# Patient Record
Sex: FEMALE | Race: BLACK OR AFRICAN AMERICAN | NOT HISPANIC OR LATINO | Employment: UNEMPLOYED | URBAN - METROPOLITAN AREA
[De-identification: names, ages, dates, MRNs, and addresses within clinical notes are randomized per-mention and may not be internally consistent; named-entity substitution may affect disease eponyms.]

---

## 2022-06-23 ENCOUNTER — HOSPITAL ENCOUNTER (EMERGENCY)
Facility: HOSPITAL | Age: 27
Discharge: HOME | End: 2022-06-23
Attending: FAMILY MEDICINE
Payer: MEDICAID

## 2022-06-23 VITALS
BODY MASS INDEX: 39.56 KG/M2 | DIASTOLIC BLOOD PRESSURE: 83 MMHG | HEART RATE: 79 BPM | HEIGHT: 62 IN | SYSTOLIC BLOOD PRESSURE: 141 MMHG | WEIGHT: 215 LBS | OXYGEN SATURATION: 98 % | RESPIRATION RATE: 18 BRPM | TEMPERATURE: 98 F

## 2022-06-23 DIAGNOSIS — J06.9 VIRAL URI WITH COUGH: Primary | ICD-10-CM

## 2022-06-23 PROCEDURE — 99284 EMERGENCY DEPT VISIT MOD MDM: CPT | Mod: 25

## 2022-06-23 RX ORDER — LORATADINE 10 MG/1
10 TABLET ORAL DAILY
Qty: 60 TABLET | Refills: 0 | Status: ON HOLD | OUTPATIENT
Start: 2022-06-23 | End: 2023-06-09

## 2022-06-23 RX ORDER — METHYLPREDNISOLONE 4 MG/1
TABLET ORAL
Qty: 21 EACH | Refills: 0 | Status: ON HOLD | OUTPATIENT
Start: 2022-06-23 | End: 2023-06-09

## 2022-06-23 RX ORDER — BENZONATATE 100 MG/1
100 CAPSULE ORAL 3 TIMES DAILY PRN
Qty: 15 CAPSULE | Refills: 0 | Status: SHIPPED | OUTPATIENT
Start: 2022-06-23 | End: 2022-06-28

## 2022-06-23 NOTE — ED PROVIDER NOTES
Encounter Date: 6/23/2022       History     Chief Complaint   Patient presents with    Facial Pain    Cough     27-year-old female presents with nasal congestion and dry cough for the past 2 days.  Patient also checked in with 5 of her children.  They are sick with what appears to be viral gastroenteritis.  Patient denies fever, chest pain, shortness of breath.  Denies possibility of pregnancy.  She is not breastfeeding.  No other complaints.        Review of patient's allergies indicates:  No Known Allergies  No past medical history on file.  No past surgical history on file.  No family history on file.     Review of Systems   Constitutional: Negative.    HENT: Positive for congestion.    Eyes: Negative.    Respiratory: Positive for cough.    Cardiovascular: Negative.    Gastrointestinal: Negative.    Endocrine: Negative.    Genitourinary: Negative.    Musculoskeletal: Negative.    Skin: Negative.    Allergic/Immunologic: Negative.    Neurological: Negative.    Hematological: Negative.    Psychiatric/Behavioral: Negative.        Physical Exam     Initial Vitals [06/23/22 0041]   BP Pulse Resp Temp SpO2   (!) 141/83 79 18 98.1 °F (36.7 °C) 98 %      MAP       --         Physical Exam    Nursing note and vitals reviewed.  Constitutional: She appears well-developed and well-nourished.   HENT:   Head: Normocephalic and atraumatic.   Mouth/Throat: Oropharynx is clear and moist.   Eyes: EOM are normal. Pupils are equal, round, and reactive to light.   Neck: Neck supple.   Normal range of motion.  Cardiovascular: Normal rate and regular rhythm.   Pulmonary/Chest: Breath sounds normal.   Abdominal: Abdomen is soft. Bowel sounds are normal.   Musculoskeletal:         General: Normal range of motion.      Cervical back: Normal range of motion and neck supple.     Neurological: She is alert and oriented to person, place, and time. She has normal strength. GCS score is 15. GCS eye subscore is 4. GCS verbal subscore is 5. GCS  motor subscore is 6.   Skin: Skin is warm. Capillary refill takes less than 2 seconds.   Psychiatric: She has a normal mood and affect.         ED Course   Procedures  Labs Reviewed - No data to display       Imaging Results    None          Medications - No data to display                       Clinical Impression:   Final diagnoses:  [J06.9] Viral URI with cough (Primary)          ED Disposition Condition    Discharge Stable        ED Prescriptions     Medication Sig Dispense Start Date End Date Auth. Provider    methylPREDNISolone (MEDROL DOSEPACK) 4 mg tablet use as directed 21 each 6/23/2022  Martin Coppola MD    loratadine (CLARITIN) 10 mg tablet Take 1 tablet (10 mg total) by mouth once daily. 60 tablet 6/23/2022 6/23/2023 Martin Coppola MD    benzonatate (TESSALON) 100 MG capsule Take 1 capsule (100 mg total) by mouth 3 (three) times daily as needed for Cough. 15 capsule 6/23/2022 6/28/2022 Martin Coppola MD        Follow-up Information     Follow up With Specialties Details Why Contact Info    Your PCP  Today             Martin Coppola MD  06/23/22 0125

## 2023-06-04 ENCOUNTER — HOSPITAL ENCOUNTER (INPATIENT)
Facility: HOSPITAL | Age: 28
LOS: 8 days | Discharge: HOME | End: 2023-06-12
Attending: OBSTETRICS & GYNECOLOGY | Admitting: OBSTETRICS & GYNECOLOGY
Payer: MEDICAID

## 2023-06-04 ENCOUNTER — ANESTHESIA (OUTPATIENT)
Dept: OBSTETRICS AND GYNECOLOGY | Facility: HOSPITAL | Age: 28
End: 2023-06-04
Payer: MEDICAID

## 2023-06-04 ENCOUNTER — ANESTHESIA EVENT (OUTPATIENT)
Dept: OBSTETRICS AND GYNECOLOGY | Facility: HOSPITAL | Age: 28
End: 2023-06-04
Payer: MEDICAID

## 2023-06-04 DIAGNOSIS — Z3A.37 37 WEEKS GESTATION OF PREGNANCY: Primary | ICD-10-CM

## 2023-06-04 DIAGNOSIS — O42.90 AMNIOTIC FLUID LEAKING: ICD-10-CM

## 2023-06-04 LAB
BASOPHILS # BLD AUTO: 0.01 X10(3)/MCL
BASOPHILS NFR BLD AUTO: 0.1 %
EOSINOPHIL # BLD AUTO: 0.23 X10(3)/MCL (ref 0–0.9)
EOSINOPHIL NFR BLD AUTO: 2.6 %
ERYTHROCYTE [DISTWIDTH] IN BLOOD BY AUTOMATED COUNT: 19.2 % (ref 11.5–17)
GROUP & RH: NORMAL
HBV SURFACE AG SERPL QL IA: NONREACTIVE
HCT VFR BLD AUTO: 29.5 % (ref 37–47)
HGB BLD-MCNC: 8.6 G/DL (ref 12–16)
HIV 1+2 AB+HIV1 P24 AG SERPL QL IA: NONREACTIVE
IMM GRANULOCYTES # BLD AUTO: 0.04 X10(3)/MCL (ref 0–0.04)
IMM GRANULOCYTES NFR BLD AUTO: 0.5 %
INDIRECT COOMBS GEL: NORMAL
LYMPHOCYTES # BLD AUTO: 1.67 X10(3)/MCL (ref 0.6–4.6)
LYMPHOCYTES NFR BLD AUTO: 19.2 %
MCH RBC QN AUTO: 20 PG (ref 27–31)
MCHC RBC AUTO-ENTMCNC: 29.2 G/DL (ref 33–36)
MCV RBC AUTO: 68.8 FL (ref 80–94)
MONOCYTES # BLD AUTO: 1.08 X10(3)/MCL (ref 0.1–1.3)
MONOCYTES NFR BLD AUTO: 12.4 %
NEUTROPHILS # BLD AUTO: 5.65 X10(3)/MCL (ref 2.1–9.2)
NEUTROPHILS NFR BLD AUTO: 65.2 %
NRBC BLD AUTO-RTO: 0 %
PLATELET # BLD AUTO: 214 X10(3)/MCL (ref 130–400)
PMV BLD AUTO: ABNORMAL FL
RBC # BLD AUTO: 4.29 X10(6)/MCL (ref 4.2–5.4)
SPECIMEN OUTDATE: NORMAL
T PALLIDUM AB SER QL: NONREACTIVE
WBC # SPEC AUTO: 8.68 X10(3)/MCL (ref 4.5–11.5)

## 2023-06-04 PROCEDURE — 63600175 PHARM REV CODE 636 W HCPCS: Performed by: OBSTETRICS & GYNECOLOGY

## 2023-06-04 PROCEDURE — 36004724 HC OB OR TIME LEV III - 1ST 15 MIN: Performed by: OBSTETRICS & GYNECOLOGY

## 2023-06-04 PROCEDURE — C1765 ADHESION BARRIER: HCPCS | Performed by: OBSTETRICS & GYNECOLOGY

## 2023-06-04 PROCEDURE — 37000008 HC ANESTHESIA 1ST 15 MINUTES: Performed by: OBSTETRICS & GYNECOLOGY

## 2023-06-04 PROCEDURE — 25000003 PHARM REV CODE 250: Performed by: ANESTHESIOLOGY

## 2023-06-04 PROCEDURE — 86762 RUBELLA ANTIBODY: CPT | Performed by: OBSTETRICS & GYNECOLOGY

## 2023-06-04 PROCEDURE — 59514 PRA REAN DELIVERY ONLY: ICD-10-PCS | Mod: QY,ANES,, | Performed by: ANESTHESIOLOGY

## 2023-06-04 PROCEDURE — 88307 TISSUE EXAM BY PATHOLOGIST: CPT | Performed by: OBSTETRICS & GYNECOLOGY

## 2023-06-04 PROCEDURE — 62322 NJX INTERLAMINAR LMBR/SAC: CPT | Performed by: NURSE ANESTHETIST, CERTIFIED REGISTERED

## 2023-06-04 PROCEDURE — 63600175 PHARM REV CODE 636 W HCPCS: Performed by: NURSE ANESTHETIST, CERTIFIED REGISTERED

## 2023-06-04 PROCEDURE — 87389 HIV-1 AG W/HIV-1&-2 AB AG IA: CPT | Performed by: OBSTETRICS & GYNECOLOGY

## 2023-06-04 PROCEDURE — 59514 CESAREAN DELIVERY ONLY: CPT | Mod: QX,CRNA,, | Performed by: NURSE ANESTHETIST, CERTIFIED REGISTERED

## 2023-06-04 PROCEDURE — 85025 COMPLETE CBC W/AUTO DIFF WBC: CPT | Performed by: OBSTETRICS & GYNECOLOGY

## 2023-06-04 PROCEDURE — 37000009 HC ANESTHESIA EA ADD 15 MINS: Performed by: OBSTETRICS & GYNECOLOGY

## 2023-06-04 PROCEDURE — 11000001 HC ACUTE MED/SURG PRIVATE ROOM

## 2023-06-04 PROCEDURE — 59514 PRA REAN DELIVERY ONLY: ICD-10-PCS | Mod: QX,CRNA,, | Performed by: NURSE ANESTHETIST, CERTIFIED REGISTERED

## 2023-06-04 PROCEDURE — 27201423 OPTIME MED/SURG SUP & DEVICES STERILE SUPPLY: Performed by: OBSTETRICS & GYNECOLOGY

## 2023-06-04 PROCEDURE — 86900 BLOOD TYPING SEROLOGIC ABO: CPT | Performed by: OBSTETRICS & GYNECOLOGY

## 2023-06-04 PROCEDURE — 59514 CESAREAN DELIVERY ONLY: CPT | Mod: QY,ANES,, | Performed by: ANESTHESIOLOGY

## 2023-06-04 PROCEDURE — 86923 COMPATIBILITY TEST ELECTRIC: CPT | Performed by: OBSTETRICS & GYNECOLOGY

## 2023-06-04 PROCEDURE — 99465 NB RESUSCITATION: CPT

## 2023-06-04 PROCEDURE — 86780 TREPONEMA PALLIDUM: CPT | Performed by: OBSTETRICS & GYNECOLOGY

## 2023-06-04 PROCEDURE — 25000003 PHARM REV CODE 250: Performed by: OBSTETRICS & GYNECOLOGY

## 2023-06-04 PROCEDURE — 36004725 HC OB OR TIME LEV III - EA ADD 15 MIN: Performed by: OBSTETRICS & GYNECOLOGY

## 2023-06-04 PROCEDURE — 87340 HEPATITIS B SURFACE AG IA: CPT | Performed by: OBSTETRICS & GYNECOLOGY

## 2023-06-04 DEVICE — BARRIER SEPRAFILM 4-SECTION: Type: IMPLANTABLE DEVICE | Site: ABDOMEN | Status: FUNCTIONAL

## 2023-06-04 RX ORDER — CEFAZOLIN SODIUM 2 G/50ML
2 SOLUTION INTRAVENOUS
Status: COMPLETED | OUTPATIENT
Start: 2023-06-04 | End: 2023-06-05

## 2023-06-04 RX ORDER — MISOPROSTOL 100 UG/1
800 TABLET ORAL ONCE AS NEEDED
Status: DISCONTINUED | OUTPATIENT
Start: 2023-06-04 | End: 2023-06-04

## 2023-06-04 RX ORDER — OXYTOCIN/RINGER'S LACTATE 30/500 ML
95 PLASTIC BAG, INJECTION (ML) INTRAVENOUS ONCE AS NEEDED
Status: DISCONTINUED | OUTPATIENT
Start: 2023-06-04 | End: 2023-06-12 | Stop reason: HOSPADM

## 2023-06-04 RX ORDER — BISACODYL 10 MG
10 SUPPOSITORY, RECTAL RECTAL ONCE AS NEEDED
Status: DISCONTINUED | OUTPATIENT
Start: 2023-06-04 | End: 2023-06-12 | Stop reason: HOSPADM

## 2023-06-04 RX ORDER — PRENATAL WITH FERROUS FUM AND FOLIC ACID 3080; 920; 120; 400; 22; 1.84; 3; 20; 10; 1; 12; 200; 27; 25; 2 [IU]/1; [IU]/1; MG/1; [IU]/1; MG/1; MG/1; MG/1; MG/1; MG/1; MG/1; UG/1; MG/1; MG/1; MG/1; MG/1
1 TABLET ORAL DAILY
Status: DISCONTINUED | OUTPATIENT
Start: 2023-06-04 | End: 2023-06-12 | Stop reason: HOSPADM

## 2023-06-04 RX ORDER — DIPHENHYDRAMINE HCL 25 MG
25 CAPSULE ORAL EVERY 4 HOURS PRN
Status: DISCONTINUED | OUTPATIENT
Start: 2023-06-04 | End: 2023-06-12 | Stop reason: HOSPADM

## 2023-06-04 RX ORDER — MISOPROSTOL 100 UG/1
800 TABLET ORAL ONCE AS NEEDED
Status: DISCONTINUED | OUTPATIENT
Start: 2023-06-04 | End: 2023-06-12 | Stop reason: HOSPADM

## 2023-06-04 RX ORDER — CARBOPROST TROMETHAMINE 250 UG/ML
250 INJECTION, SOLUTION INTRAMUSCULAR
Status: DISCONTINUED | OUTPATIENT
Start: 2023-06-04 | End: 2023-06-12 | Stop reason: HOSPADM

## 2023-06-04 RX ORDER — OXYTOCIN/RINGER'S LACTATE 30/500 ML
334 PLASTIC BAG, INJECTION (ML) INTRAVENOUS ONCE AS NEEDED
Status: DISCONTINUED | OUTPATIENT
Start: 2023-06-04 | End: 2023-06-12 | Stop reason: HOSPADM

## 2023-06-04 RX ORDER — CEFAZOLIN SODIUM 2 G/50ML
2 SOLUTION INTRAVENOUS
Status: DISCONTINUED | OUTPATIENT
Start: 2023-06-04 | End: 2023-06-04

## 2023-06-04 RX ORDER — ADHESIVE BANDAGE
30 BANDAGE TOPICAL 2 TIMES DAILY PRN
Status: DISCONTINUED | OUTPATIENT
Start: 2023-06-05 | End: 2023-06-12 | Stop reason: HOSPADM

## 2023-06-04 RX ORDER — CARBOPROST TROMETHAMINE 250 UG/ML
250 INJECTION, SOLUTION INTRAMUSCULAR
Status: DISCONTINUED | OUTPATIENT
Start: 2023-06-04 | End: 2023-06-04

## 2023-06-04 RX ORDER — DIPHENHYDRAMINE HYDROCHLORIDE 50 MG/ML
25 INJECTION INTRAMUSCULAR; INTRAVENOUS EVERY 6 HOURS PRN
Status: DISCONTINUED | OUTPATIENT
Start: 2023-06-04 | End: 2023-06-12 | Stop reason: HOSPADM

## 2023-06-04 RX ORDER — METHYLERGONOVINE MALEATE 0.2 MG/ML
200 INJECTION INTRAVENOUS
Status: DISCONTINUED | OUTPATIENT
Start: 2023-06-04 | End: 2023-06-04

## 2023-06-04 RX ORDER — MUPIROCIN 20 MG/G
OINTMENT TOPICAL 2 TIMES DAILY
Status: DISCONTINUED | OUTPATIENT
Start: 2023-06-04 | End: 2023-06-04

## 2023-06-04 RX ORDER — IBUPROFEN 800 MG/1
800 TABLET ORAL EVERY 8 HOURS
Status: DISCONTINUED | OUTPATIENT
Start: 2023-06-04 | End: 2023-06-12 | Stop reason: HOSPADM

## 2023-06-04 RX ORDER — SODIUM CITRATE AND CITRIC ACID MONOHYDRATE 334; 500 MG/5ML; MG/5ML
30 SOLUTION ORAL
Status: DISCONTINUED | OUTPATIENT
Start: 2023-06-04 | End: 2023-06-04

## 2023-06-04 RX ORDER — METHYLERGONOVINE MALEATE 0.2 MG/ML
200 INJECTION INTRAVENOUS
Status: DISCONTINUED | OUTPATIENT
Start: 2023-06-04 | End: 2023-06-12 | Stop reason: HOSPADM

## 2023-06-04 RX ORDER — FAMOTIDINE 10 MG/ML
20 INJECTION INTRAVENOUS
Status: DISCONTINUED | OUTPATIENT
Start: 2023-06-04 | End: 2023-06-04

## 2023-06-04 RX ORDER — MORPHINE SULFATE 0.5 MG/ML
INJECTION, SOLUTION EPIDURAL; INTRATHECAL; INTRAVENOUS
Status: DISCONTINUED | OUTPATIENT
Start: 2023-06-04 | End: 2023-06-04

## 2023-06-04 RX ORDER — OXYTOCIN/RINGER'S LACTATE 30/500 ML
95 PLASTIC BAG, INJECTION (ML) INTRAVENOUS ONCE
Status: DISCONTINUED | OUTPATIENT
Start: 2023-06-04 | End: 2023-06-04

## 2023-06-04 RX ORDER — DOCUSATE SODIUM 100 MG/1
200 CAPSULE, LIQUID FILLED ORAL 2 TIMES DAILY
Status: DISCONTINUED | OUTPATIENT
Start: 2023-06-04 | End: 2023-06-04

## 2023-06-04 RX ORDER — AMOXICILLIN 250 MG
1 CAPSULE ORAL NIGHTLY PRN
Status: DISCONTINUED | OUTPATIENT
Start: 2023-06-04 | End: 2023-06-12 | Stop reason: HOSPADM

## 2023-06-04 RX ORDER — MUPIROCIN 20 MG/G
OINTMENT TOPICAL
Status: DISCONTINUED | OUTPATIENT
Start: 2023-06-04 | End: 2023-06-04

## 2023-06-04 RX ORDER — ACETAMINOPHEN 10 MG/ML
INJECTION, SOLUTION INTRAVENOUS
Status: DISCONTINUED | OUTPATIENT
Start: 2023-06-04 | End: 2023-06-04

## 2023-06-04 RX ORDER — OXYCODONE AND ACETAMINOPHEN 10; 325 MG/1; MG/1
1 TABLET ORAL EVERY 4 HOURS PRN
Status: DISCONTINUED | OUTPATIENT
Start: 2023-06-04 | End: 2023-06-12 | Stop reason: HOSPADM

## 2023-06-04 RX ORDER — PHENYLEPHRINE HYDROCHLORIDE 10 MG/ML
INJECTION INTRAVENOUS
Status: DISCONTINUED | OUTPATIENT
Start: 2023-06-04 | End: 2023-06-04

## 2023-06-04 RX ORDER — SODIUM CHLORIDE 0.9 % (FLUSH) 0.9 %
10 SYRINGE (ML) INJECTION
Status: DISCONTINUED | OUTPATIENT
Start: 2023-06-04 | End: 2023-06-12 | Stop reason: HOSPADM

## 2023-06-04 RX ORDER — PROCHLORPERAZINE EDISYLATE 5 MG/ML
5 INJECTION INTRAMUSCULAR; INTRAVENOUS EVERY 6 HOURS PRN
Status: DISCONTINUED | OUTPATIENT
Start: 2023-06-04 | End: 2023-06-12 | Stop reason: HOSPADM

## 2023-06-04 RX ORDER — SIMETHICONE 80 MG
1 TABLET,CHEWABLE ORAL EVERY 6 HOURS PRN
Status: DISCONTINUED | OUTPATIENT
Start: 2023-06-04 | End: 2023-06-12 | Stop reason: HOSPADM

## 2023-06-04 RX ORDER — NALOXONE HCL 0.4 MG/ML
0.04 VIAL (ML) INJECTION
Status: CANCELLED | OUTPATIENT
Start: 2023-06-04

## 2023-06-04 RX ORDER — OXYCODONE AND ACETAMINOPHEN 5; 325 MG/1; MG/1
1 TABLET ORAL EVERY 6 HOURS PRN
Status: DISCONTINUED | OUTPATIENT
Start: 2023-06-04 | End: 2023-06-12 | Stop reason: HOSPADM

## 2023-06-04 RX ORDER — ONDANSETRON 2 MG/ML
INJECTION INTRAMUSCULAR; INTRAVENOUS
Status: DISCONTINUED | OUTPATIENT
Start: 2023-06-04 | End: 2023-06-04

## 2023-06-04 RX ORDER — BUPIVACAINE HYDROCHLORIDE 7.5 MG/ML
INJECTION, SOLUTION EPIDURAL; RETROBULBAR
Status: COMPLETED | OUTPATIENT
Start: 2023-06-04 | End: 2023-06-04

## 2023-06-04 RX ORDER — FENTANYL CITRATE 50 UG/ML
INJECTION, SOLUTION INTRAMUSCULAR; INTRAVENOUS
Status: DISCONTINUED | OUTPATIENT
Start: 2023-06-04 | End: 2023-06-04

## 2023-06-04 RX ORDER — OXYCODONE AND ACETAMINOPHEN 10; 325 MG/1; MG/1
1 TABLET ORAL EVERY 6 HOURS PRN
Status: DISCONTINUED | OUTPATIENT
Start: 2023-06-04 | End: 2023-06-12 | Stop reason: HOSPADM

## 2023-06-04 RX ORDER — MORPHINE SULFATE 4 MG/ML
4 INJECTION, SOLUTION INTRAMUSCULAR; INTRAVENOUS EVERY 4 HOURS PRN
Status: DISCONTINUED | OUTPATIENT
Start: 2023-06-04 | End: 2023-06-12 | Stop reason: HOSPADM

## 2023-06-04 RX ORDER — ONDANSETRON 4 MG/1
8 TABLET, ORALLY DISINTEGRATING ORAL EVERY 8 HOURS PRN
Status: DISCONTINUED | OUTPATIENT
Start: 2023-06-04 | End: 2023-06-12 | Stop reason: HOSPADM

## 2023-06-04 RX ORDER — OXYTOCIN 10 [USP'U]/ML
10 INJECTION, SOLUTION INTRAMUSCULAR; INTRAVENOUS ONCE AS NEEDED
Status: DISCONTINUED | OUTPATIENT
Start: 2023-06-04 | End: 2023-06-12 | Stop reason: HOSPADM

## 2023-06-04 RX ORDER — OXYTOCIN/RINGER'S LACTATE 30/500 ML
PLASTIC BAG, INJECTION (ML) INTRAVENOUS CONTINUOUS PRN
Status: DISCONTINUED | OUTPATIENT
Start: 2023-06-04 | End: 2023-06-04

## 2023-06-04 RX ORDER — PROCHLORPERAZINE EDISYLATE 5 MG/ML
5 INJECTION INTRAMUSCULAR; INTRAVENOUS EVERY 6 HOURS PRN
Status: CANCELLED | OUTPATIENT
Start: 2023-06-04

## 2023-06-04 RX ORDER — OXYTOCIN/RINGER'S LACTATE 30/500 ML
95 PLASTIC BAG, INJECTION (ML) INTRAVENOUS ONCE
Status: COMPLETED | OUTPATIENT
Start: 2023-06-04 | End: 2023-06-04

## 2023-06-04 RX ORDER — CEFAZOLIN SODIUM 1 G/3ML
INJECTION, POWDER, FOR SOLUTION INTRAMUSCULAR; INTRAVENOUS
Status: DISCONTINUED | OUTPATIENT
Start: 2023-06-04 | End: 2023-06-04

## 2023-06-04 RX ORDER — ONDANSETRON 2 MG/ML
4 INJECTION INTRAMUSCULAR; INTRAVENOUS EVERY 12 HOURS PRN
Status: CANCELLED | OUTPATIENT
Start: 2023-06-04

## 2023-06-04 RX ORDER — OXYTOCIN/RINGER'S LACTATE 30/500 ML
334 PLASTIC BAG, INJECTION (ML) INTRAVENOUS ONCE
Status: DISCONTINUED | OUTPATIENT
Start: 2023-06-04 | End: 2023-06-04

## 2023-06-04 RX ADMIN — ONDANSETRON 4 MG: 2 INJECTION INTRAMUSCULAR; INTRAVENOUS at 02:06

## 2023-06-04 RX ADMIN — Medication 500 ML/HR: at 02:06

## 2023-06-04 RX ADMIN — MORPHINE SULFATE 4 MG: 4 INJECTION INTRAVENOUS at 04:06

## 2023-06-04 RX ADMIN — CEFAZOLIN 2 G: 330 INJECTION, POWDER, FOR SOLUTION INTRAMUSCULAR; INTRAVENOUS at 02:06

## 2023-06-04 RX ADMIN — IBUPROFEN 800 MG: 800 TABLET, FILM COATED ORAL at 11:06

## 2023-06-04 RX ADMIN — MORPHINE SULFATE 0.1 MG: 0.5 INJECTION, SOLUTION EPIDURAL; INTRATHECAL; INTRAVENOUS at 02:06

## 2023-06-04 RX ADMIN — IBUPROFEN 800 MG: 800 TABLET, FILM COATED ORAL at 02:06

## 2023-06-04 RX ADMIN — BUPIVACAINE HYDROCHLORIDE 1.6 ML: 7.5 INJECTION, SOLUTION EPIDURAL; RETROBULBAR at 02:06

## 2023-06-04 RX ADMIN — MORPHINE SULFATE 4 MG: 4 INJECTION INTRAVENOUS at 08:06

## 2023-06-04 RX ADMIN — PHENYLEPHRINE HYDROCHLORIDE 100 MCG: 10 INJECTION INTRAVENOUS at 02:06

## 2023-06-04 RX ADMIN — Medication 95 MILLI-UNITS/MIN: at 04:06

## 2023-06-04 RX ADMIN — ACETAMINOPHEN 1000 MG: 10 INJECTION, SOLUTION INTRAVENOUS at 03:06

## 2023-06-04 RX ADMIN — FENTANYL CITRATE 10 MCG: 50 INJECTION, SOLUTION INTRAMUSCULAR; INTRAVENOUS at 02:06

## 2023-06-04 RX ADMIN — CEFAZOLIN SODIUM 2 G: 2 SOLUTION INTRAVENOUS at 02:06

## 2023-06-04 RX ADMIN — SODIUM CHLORIDE, POTASSIUM CHLORIDE, SODIUM LACTATE AND CALCIUM CHLORIDE: 600; 310; 30; 20 INJECTION, SOLUTION INTRAVENOUS at 02:06

## 2023-06-04 NOTE — H&P
[facility]                       [address] [id number]    PATIENT NAME:       JEANIE GODWIN  YOB: 1995  CSN:                [id number]   MRN:                [id number]  ADMIT DATE:         2023 01:12:00  PHYSICIAN:          Jarvis Holguin MD                        HISTORY AND PHYSICAL      HISTORY OF PRESENT ILLNESS:  Ms. Grossman is a 28-year-old female,  9,   para 7, AB1.  Last menstrual period is 10/20/2022, EDC is 23.  She   presented at 37 and 4/7th weeks of gestation in labor, previous  section   x3 with rupture of membranes.    PAST SURGICAL HISTORY:  The patient has had oral surgery.  She has had 3 previous  sections.    ALLERGIES:  NO KNOWN MEDICAL ALLERGIES.     MEDICATIONS:  Include prenatal vitamins and iron.    PHYSICAL EXAMINATION:  HEAD EARS, EYES, NOSE AND THROAT:  Within normal limits.    CHEST:  Clear A to P.  HEART:  Normal sinus rhythm.  ABDOMEN:  Gravid.  PELVIC:  Per the nurse, the patient is 2 cm dilated with rupture of membranes   with meconium staining, thin, 90% effaced, vertex.    EXTREMITIES:  2+ pedal edema NEUROLOGIC:  Cranial nerves 2-12 are grossly   intact.    IMPRESSION:  A 37 and 4/7 th week intrauterine pregnancy, previous    section x3, labor.      The plan is admission and  section.        ______________________________  Jarvis Holguin MD    DCR/AQS  DD:  2023  Time:  02:11AM  DT:  2023  Time:  02:33AM  Job #:  [id number]      HISTORY AND PHYSICAL

## 2023-06-04 NOTE — ANESTHESIA POSTPROCEDURE EVALUATION
Anesthesia Post Evaluation    Patient: Tita Canada    Procedure(s) Performed: Procedure(s) (LRB):   SECTION (N/A)    Final Anesthesia Type: epidural      Patient location during evaluation: floor  Patient participation: Yes- Able to Participate  Level of consciousness: awake and alert  Post-procedure vital signs: reviewed and stable  Pain management: adequate  Airway patency: patent    PONV status at discharge: vomiting (controlled) and nausea (controlled)  Anesthetic complications: no      Cardiovascular status: hemodynamically stable  Respiratory status: spontaneous ventilation  Hydration status: euvolemic  Follow-up not needed.  Comments:   Post op visit on Mother Baby following SALVADOR or SAB for Obstetrical Anesthesia/Analgesia.  Block resolved.Pt ambulating. No complaints of headache.          Vitals Value Taken Time   /75 23 1203   Temp 36 °C (96.8 °F) 23 1203   Pulse 92 23 1203   Resp 18 23 0930   SpO2 100 % 23 0424         No case tracking events are documented in the log.      Pain/Madi Score: Pain Rating Prior to Med Admin: 10 (2023  8:47 AM)  Madi Score: 9 (2023  4:00 AM)

## 2023-06-04 NOTE — TRANSFER OF CARE
"Anesthesia Transfer of Care Note    Patient: Tita Canada    Procedure(s) Performed: Procedure(s) (LRB):   SECTION (N/A)    Patient location: Labor and Delivery    Anesthesia Type: spinal    Transport from OR: Transported from OR on room air with adequate spontaneous ventilation    Post pain: adequate analgesia    Post assessment: no apparent anesthetic complications    Post vital signs: stable    Level of consciousness: awake, alert and oriented    Nausea/Vomiting: no nausea/vomiting    Complications: none    Transfer of care protocol was followed      Last vitals:   Visit Vitals  /74 (BP Location: Right arm, Patient Position: Lying)   Pulse 88   Temp 36.5 °C (97.7 °F) (Oral)   Resp 14   Ht 5' 2" (1.575 m)   Wt 113.9 kg (251 lb)   SpO2 98%   Breastfeeding No   BMI 45.91 kg/m²     "

## 2023-06-04 NOTE — OP NOTE
[facility]                       [address] [place]                      [place] [address]    PATIENT NAME:      JEANIE GODWIN  YOB: 1995  CSN:               [id number]  MRN:               [id number]  ADMIT DATE:        2023 01:12:00  PHYSICIAN:         Jarvis Holguin MD                          OPERATIVE REPORT      DATE OF SURGERY:    2023 00:00:00    SURGEON:  Jarvis Holguin MD    PREOPERATIVE DIAGNOSES:  37 and 4/7th week intrauterine pregnancy, previous    section x3, labor, rupture of membranes.    POSTOPERATIVE DIAGNOSES:  37 and 4/7th week intrauterine pregnancy, previous    section x3, labor, rupture of membranes including meconium staining   liberation.    OPERATIVE PROCEDURE PERFORMED:  Repeat low transverse  section and   pelvic adhesiolysis.    ASSISTANT SURGEON:  Teddy Vega MD    BLOOD LOSS:  500 cc.    ANESTHETIC AGENT:  Spinal.    COMPLICATIONS:  None.    TECHNIQUE:  After all risks, benefits, alternative therapies were offered to the   patient and the family and informed consents were signed, the patient was   brought to the operative suite at [facility] and placed in   a sitting position and given a spinal anesthetic agent.  Next, she was placed   in the supine position and prepped and draped in usual sterile fashion.  With   the first scalpel, a transverse Pfannenstiel skin incision was performed.  With   the second scalpel, the depth of the incision was taken down to the fascial   layers.  The fascia was nicked transversely and gently dissected off the rectus   muscles cephalic and caudad.  The rectus muscles were split at the midline,   followed by elevation of the peritoneum with pickups with teeth x2.  The   vertical peritoneal incision was performed.  The lower uterine segment   transverse serosal incision was performed.  Creating a bladder  flap, the lower   uterine segment transverse myometrial incision was performed.  The bag of umaña   was again visualized.  There was attempted delivery of the infant's head.    There was some difficulty.  Dr. Vega transected part of the left rectus   muscles.  This enabled us to have enough space to deliver the infant's head.    There was no bleeding of the rectus muscle at the time.  There was prompt   suctioning of the oral and nasopharynx, followed by application of fundal   pressure and subsequent delivery of the body of the infant.  The cord was   clamped x2 and cord blood was obtained after the cord was transected.  The   placenta was removed with meconium staining and was sent to pathology for   histopathologic diagnoses.  There was removal of all membranous material from   the uterine cavity.  There was closure of the lower uterine segment myometrial   incision with 0 Vicryl suture material.  Imbrication of the initial closure line   was done with the same suture material.  The pericolic gutters were checked and   cleaned.  There was noted to be normal tubes and ovaries bilaterally.  Upon   entering the pelvis, initially there was some adhesions on the bladder flap that   were taken down with the Metzenbaum scissors.  There was removal of all   surgical instrumentation.  Correct count was noted x2.  Copious irrigation of   the abdominal peritoneal cavity with sterile saline was performed.  Again, the   count was performed.  Seprafilm was placed over the lower uterine segment.    There was closure of the anterior peritoneum with 2-0 Vicryl suture material.    The rectus muscles were reapposed with figure-of-eight sutures of 2-0 Vicryl   suture material.  The fascia was closed with #1 Vicryl suture material.    Subcutaneous fat was closed with 2-0 Vicryl suture material.  Skin was closed   with skin clips.  The patient was discharged to postop for normal postop  care.        ______________________________  MD HARDIK Lyles/SUNITHA  DD:  06/04/2023  Time:  03:30AM  DT:  06/04/2023  Time:  04:03AM  Job #:  [id number]      OPERATIVE REPORT

## 2023-06-04 NOTE — PLAN OF CARE
Problem: Adult Inpatient Plan of Care  Goal: Plan of Care Review  Outcome: Ongoing, Progressing  Goal: Patient-Specific Goal (Individualized)  Outcome: Ongoing, Progressing  Flowsheets (Taken 2023 0600)  Individualized Care Needs: i want to breast and bottle feed  Goal: Absence of Hospital-Acquired Illness or Injury  Outcome: Ongoing, Progressing  Goal: Optimal Comfort and Wellbeing  Outcome: Ongoing, Progressing  Goal: Readiness for Transition of Care  Outcome: Ongoing, Progressing     Problem:  Fall Injury Risk  Goal: Absence of Fall, Infant Drop and Related Injury  Outcome: Ongoing, Progressing     Problem: Infection  Goal: Absence of Infection Signs and Symptoms  Outcome: Ongoing, Progressing     Problem: Bariatric Environmental Safety  Goal: Safety Maintained with Care  Outcome: Ongoing, Progressing

## 2023-06-04 NOTE — ANESTHESIA PROCEDURE NOTES
Spinal    Diagnosis: c section  Patient location during procedure: OB  Start time: 6/4/2023 2:20 AM  Timeout: 6/4/2023 2:15 AM  End time: 6/4/2023 2:32 AM    Staffing  Authorizing Provider: Gen Kay MD  Performing Provider: Felix Benítez CRNA    Preanesthetic Checklist  Completed: patient identified, IV checked, site marked, risks and benefits discussed, surgical consent, monitors and equipment checked, pre-op evaluation and timeout performed  Spinal Block  Patient position: sitting  Prep: ChloraPrep  Patient monitoring: continuous pulse ox, frequent blood pressure checks and heart rate  Approach: midline  Location: L3-4  Injection technique: single shot  CSF Fluid: clear free-flowing CSF  Needle  Needle type: pencil-tip   Needle gauge: 25 G  Needle length: 5 in  Additional Documentation: negative aspiration for heme, no paresthesia on injection and incremental injection  Needle localization: anatomical landmarks  Assessment  Sensory level: T6  Ease of block: easy  Patient's tolerance of the procedure: comfortable throughout block and no complaints  Additional Notes  Easy spinal, first pass   Medications:    Medications: bupivacaine (pf) (MARCAINE) injection 0.75% - Intraspinal   1.6 mL - 6/4/2023 2:32:00 AM

## 2023-06-04 NOTE — ANESTHESIA PREPROCEDURE EVALUATION
"                                                                                                             2023  Tita Canada is a 28 y.o., female.  Who presents at 37.5wks in labor w/ h/o previous C/S.  Diagnosis: Pregnant [Z34.90]   Pre-op diagnosis: Repeat Section           She comes to [facility] L&D OR for the noted procedure under SAB.  Procedure:  SECTION (Abdomen)      PMHx:  Negative      Vital signs:  Pre Vitals     Current as of 23 0200  BP: 118/59 Pulse: 78   Resp: 20 SpO2: 99   Temp: 37 °C (98.6 °F)   Height: 5' 2" (1.575 m) (23) Weight: 113.9 kg (251 lb) (23)   BMI: 45.9 IBW: 50.1 kg (110 lb 7.8 oz)   Last edited 23 0131 by           Lab Data:        Pre-op Assessment    I have reviewed the Patient Summary Reports.     I have reviewed the Nursing Notes. I have reviewed the NPO Status.   I have reviewed the Medications.     Review of Systems  Anesthesia Hx:  No problems with previous Anesthesia  History of prior surgery of interest to airway management or planning: Previous anesthesia: Epidural   Hematology/Oncology:  Hematology Normal   Oncology Normal     EENT/Dental:EENT/Dental Normal   Cardiovascular:  Cardiovascular Normal Exercise tolerance: good     Pulmonary:  Pulmonary Normal    Renal/:  Renal/ Normal     Hepatic/GI:  Hepatic/GI Normal    Musculoskeletal:  Musculoskeletal Normal    Neurological:  Neurology Normal    Endocrine:  Endocrine Normal    Dermatological:  Skin Normal    Psych:  Psychiatric Normal           Physical Exam  General: Well nourished, Cooperative, Alert and Oriented    Airway:  Mallampati: III   Mouth Opening: Normal  TM Distance: Normal  Tongue: Normal  Neck ROM: Normal ROM    Dental:  Intact        Anesthesia Plan  Type of Anesthesia, risks & benefits discussed:    Anesthesia Type: Spinal  Intra-op Monitoring Plan: Standard ASA Monitors  Informed Consent: Informed consent signed with the Patient and all parties understand the " Department of Internal Medicine  Nephrology Progress  Note    Events reviewed. Patient underwent HD last night in view of persistent hyperkalemia. SUBJECTIVE:  We are following Ms. Wilton Flores for ESRD on PD and hyperkalemia. Reports no complaints.     PHYSICAL EXAM:      Vitals:    VITALS:  BP (!) 144/81   Pulse 103   Temp 97.7 °F (36.5 °C) (Oral)   Resp 16   Ht 5' 4\" (1.626 m)   Wt 169 lb 12.1 oz (77 kg)   SpO2 99%   BMI 29.14 kg/m²   24HR INTAKE/OUTPUT:      Intake/Output Summary (Last 24 hours) at 9/12/2021 1611  Last data filed at 9/12/2021 0400  Gross per 24 hour   Intake 860 ml   Output 500 ml   Net 360 ml     Access: Left femoral temporary dialysis catheter in place  PD Catheter Exam:  PD catheter exit site clean    Constitutional: Awake in no acute distress  HEENT:  Normocephalic, PERRL  Respiratory: Decreased breath sounds on the basis  Cardiovascular/Edema:  RRR, S1/S2  Gastrointestinal:  Soft, PD catheter exit site clean   Neurologic:  Nonfocal, AVELAR  Skin:  Warm, dry, no lesions  Other:  left foot cyanotic    Scheduled Meds:   sodium zirconium cyclosilicate  10 g Oral Daily    sodium chloride flush  5-40 mL IntraVENous 2 times per day    heparin (porcine)  5,000 Units SubCUTAneous 3 times per day    pantoprazole  40 mg Oral QAM AC    folic acid  1 mg Oral Daily    levothyroxine  75 mcg Oral Daily    metoprolol tartrate  25 mg Oral BID    sevelamer  800 mg Oral TID WC    mirtazapine  15 mg Oral Nightly    ARIPiprazole  5 mg Oral Nightly    insulin glargine  10 Units SubCUTAneous BID    insulin lispro  0-6 Units SubCUTAneous TID WC    insulin lispro  0-3 Units SubCUTAneous Nightly     Continuous Infusions:   dextrose 5 % and 0.9 % NaCl 60 mL/hr at 09/11/21 2108    sodium chloride       PRN Meds:.oxyCODONE-acetaminophen, loperamide, dextrose, sodium chloride flush, sodium chloride, ondansetron **OR** ondansetron, polyethylene glycol, acetaminophen **OR** acetaminophen, risks and agree with anesthesia plan.  All questions answered. Patient consented to blood products? Yes  ASA Score: 2  Day of Surgery Review of History & Physical: H&P Update referred to the surgeon/provider.  Anesthesia Plan Notes: Repeat CS in labor    Ready For Surgery From Anesthesia Perspective.     .       with a long dwell of 2 L of 2.5%. Had HD yesterday for hyperkalemia. Had a lengthy discussion with Wilton about considering changing modality to long-term HD. Patient does not want to have intermittent HD on a facility but she might consider doing home HD, she will discussed with her mother. 2. Hyperkalemia, potassium levels improved after HD, to continue PD and Lokelma 10 g daily  3. HTN, on carvedilol  4. 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 Anemia of CKD, on epoetin alpha at home  -------------------------------------------------------------------  5. History of gastroparesis, on metoclopramide  6. PVD? left foot ischemia, no plans for intervention from podiatry. X-ray demonstrates broken toes left foot 2-5. Plan:    · CCPD, 5 exchanges of 2 L of 1.5% at night with a long dwell of 2 L of 2.5%. Total volume 2 liters.    · Continue to monitor potassium level    · Continue Lokelma 10 g p.o. daily  · Continue low potassium diet  · Repeat BMP 4 pm  · Await decision to place tunneled dialysis catheter for long-term home dialysis    Discussed with primary team.    Electronically signed by Keri Alexander MD on 9/12/2021 at 4:11 PM

## 2023-06-05 LAB
BASOPHILS # BLD AUTO: 0.05 X10(3)/MCL
BASOPHILS NFR BLD AUTO: 0.3 %
EOSINOPHIL # BLD AUTO: 0.09 X10(3)/MCL (ref 0–0.9)
EOSINOPHIL NFR BLD AUTO: 0.6 %
ERYTHROCYTE [DISTWIDTH] IN BLOOD BY AUTOMATED COUNT: 19.3 % (ref 11.5–17)
HCT VFR BLD AUTO: 29.9 % (ref 37–47)
HGB BLD-MCNC: 9 G/DL (ref 12–16)
IMM GRANULOCYTES # BLD AUTO: 0.07 X10(3)/MCL (ref 0–0.04)
IMM GRANULOCYTES NFR BLD AUTO: 0.5 %
LYMPHOCYTES # BLD AUTO: 0.7 X10(3)/MCL (ref 0.6–4.6)
LYMPHOCYTES NFR BLD AUTO: 4.6 %
MCH RBC QN AUTO: 20.2 PG (ref 27–31)
MCHC RBC AUTO-ENTMCNC: 30.1 G/DL (ref 33–36)
MCV RBC AUTO: 67.2 FL (ref 80–94)
MICROCYTES BLD QL SMEAR: ABNORMAL
MONOCYTES # BLD AUTO: 1.54 X10(3)/MCL (ref 0.1–1.3)
MONOCYTES NFR BLD AUTO: 10.1 %
NEUTROPHILS # BLD AUTO: 12.84 X10(3)/MCL (ref 2.1–9.2)
NEUTROPHILS NFR BLD AUTO: 83.9 %
NRBC BLD AUTO-RTO: 0 %
PLATELET # BLD AUTO: 223 X10(3)/MCL (ref 130–400)
PLATELET # BLD EST: ADEQUATE 10*3/UL
PMV BLD AUTO: 10.7 FL (ref 7.4–10.4)
POIKILOCYTOSIS BLD QL SMEAR: ABNORMAL
POLYCHROMASIA BLD QL SMEAR: SLIGHT
RBC # BLD AUTO: 4.45 X10(6)/MCL (ref 4.2–5.4)
RBC MORPH BLD: ABNORMAL
WBC # SPEC AUTO: 15.29 X10(3)/MCL (ref 4.5–11.5)

## 2023-06-05 PROCEDURE — 85025 COMPLETE CBC W/AUTO DIFF WBC: CPT | Performed by: OBSTETRICS & GYNECOLOGY

## 2023-06-05 PROCEDURE — 25000003 PHARM REV CODE 250: Performed by: OBSTETRICS & GYNECOLOGY

## 2023-06-05 PROCEDURE — 63600175 PHARM REV CODE 636 W HCPCS: Performed by: OBSTETRICS & GYNECOLOGY

## 2023-06-05 PROCEDURE — 11000001 HC ACUTE MED/SURG PRIVATE ROOM

## 2023-06-05 RX ADMIN — OXYCODONE HYDROCHLORIDE AND ACETAMINOPHEN 1 TABLET: 10; 325 TABLET ORAL at 01:06

## 2023-06-05 RX ADMIN — IBUPROFEN 800 MG: 800 TABLET, FILM COATED ORAL at 07:06

## 2023-06-05 RX ADMIN — IBUPROFEN 800 MG: 800 TABLET, FILM COATED ORAL at 10:06

## 2023-06-05 RX ADMIN — OXYCODONE HYDROCHLORIDE AND ACETAMINOPHEN 1 TABLET: 10; 325 TABLET ORAL at 05:06

## 2023-06-05 RX ADMIN — CEFAZOLIN SODIUM 2 G: 2 SOLUTION INTRAVENOUS at 02:06

## 2023-06-05 RX ADMIN — IBUPROFEN 800 MG: 800 TABLET, FILM COATED ORAL at 04:06
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
## 2023-06-05 NOTE — PROGRESS NOTES
[facility]                       [address] [id number]    PATIENT NAME:       JEANIE GODWIN  YOB: 1995  CSN:                [id number]   MRN:                [id number]  ADMIT DATE:         2023 01:12:00  PHYSICIAN:          Jarvis Holguin MD                            PROGRESS NOTE    DATE:      SUBJECTIVE:  The patient is in postop day #1, status post  section.  She   is doing well.  She is normotensive.  She is afebrile.  Her blood type is Rh   positive.  The uterus is firm.  There is scant lochia.  Incision site is within   normal limits.    IMPRESSION:  Postop day #1.    PLAN:  To continue the present management and complete the postop IV antibiotic   therapy.  I would like to get a  consult on this patient and have   the patient ambulate.        ______________________________  Jarvis Holguin MD    DCR/AQS  DD:  2023  Time:  12:32AM  DT:  2023  Time:  12:42AM  Job #:  [id number]      PROGRESS NOTE

## 2023-06-06 LAB
PSYCHE PATHOLOGY RESULT: NORMAL
RUBV IGG SERPL IA-ACNC: 1.8
RUBV IGG SERPL QL IA: POSITIVE

## 2023-06-06 PROCEDURE — 25000003 PHARM REV CODE 250: Performed by: OBSTETRICS & GYNECOLOGY

## 2023-06-06 PROCEDURE — 11000001 HC ACUTE MED/SURG PRIVATE ROOM

## 2023-06-06 RX ADMIN — IBUPROFEN 800 MG: 800 TABLET, FILM COATED ORAL at 03:06

## 2023-06-06 RX ADMIN — SIMETHICONE 80 MG: 80 TABLET, CHEWABLE ORAL at 09:06

## 2023-06-06 RX ADMIN — OXYCODONE HYDROCHLORIDE AND ACETAMINOPHEN 1 TABLET: 10; 325 TABLET ORAL at 09:06

## 2023-06-06 RX ADMIN — SIMETHICONE 80 MG: 80 TABLET, CHEWABLE ORAL at 03:06

## 2023-06-06 RX ADMIN — IBUPROFEN 800 MG: 800 TABLET, FILM COATED ORAL at 09:06

## 2023-06-06 RX ADMIN — OXYCODONE HYDROCHLORIDE AND ACETAMINOPHEN 1 TABLET: 10; 325 TABLET ORAL at 10:06

## 2023-06-06 RX ADMIN — PRENATAL VITAMINS-IRON FUMARATE 27 MG IRON-FOLIC ACID 0.8 MG TABLET 1 TABLET: at 09:06
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
## 2023-06-06 NOTE — CONSULTS
Mom completed her [place] assessment this morning with a score of 17 and an answer of 1 to question 10 which asks if she had thoughts of harming herself. Mom has had 1 to 1 supervision today. Mom appeared pleasant during visit and was cooperative. Explained to Mom that [facility] will be coming to speak with her and that it is very important to be honest with them for them to provide her the care she is needing. Mom verbalized understanding. Provided emotional support and had previously provided support/counseling resources. Ensured that Mom still had resource packet. Mom reported not having car seat and CM reminded Mom of referrals that we discussed to assist with supply needs, including a car seat. Explained to Mom that baby may be discharged at a later date than she is and explained the NICU discharge process with her. Mom verbalized understanding. Mom reported not having transportation at discharge and she was agreeable to  coordinating Medicaid transportation for her discharge and for baby's discharge. Mom reported no further social or resource needs. [facility] referrals sent.

## 2023-06-06 NOTE — PLAN OF CARE
Problem: Adult Inpatient Plan of Care  Goal: Plan of Care Review  Outcome: Ongoing, Progressing  Goal: Patient-Specific Goal (Individualized)  Outcome: Ongoing, Progressing  Flowsheets (Taken 2023 191)  Individualized Care Needs: i want to breast and bottle feed  Goal: Absence of Hospital-Acquired Illness or Injury  Outcome: Ongoing, Progressing  Goal: Optimal Comfort and Wellbeing  Outcome: Ongoing, Progressing  Goal: Readiness for Transition of Care  Outcome: Ongoing, Progressing     Problem:  Fall Injury Risk  Goal: Absence of Fall, Infant Drop and Related Injury  Outcome: Ongoing, Progressing     Problem: Infection  Goal: Absence of Infection Signs and Symptoms  Outcome: Ongoing, Progressing     Problem: Bariatric Environmental Safety  Goal: Safety Maintained with Care  Outcome: Ongoing, Progressing

## 2023-06-07 LAB
BASOPHILS # BLD AUTO: 0.03 X10(3)/MCL
BASOPHILS NFR BLD AUTO: 0.3 %
EOSINOPHIL # BLD AUTO: 0.28 X10(3)/MCL (ref 0–0.9)
EOSINOPHIL NFR BLD AUTO: 2.9 %
ERYTHROCYTE [DISTWIDTH] IN BLOOD BY AUTOMATED COUNT: 19.3 % (ref 11.5–17)
HCT VFR BLD AUTO: 25.2 % (ref 37–47)
HGB BLD-MCNC: 7.5 G/DL (ref 12–16)
IMM GRANULOCYTES # BLD AUTO: 0.05 X10(3)/MCL (ref 0–0.04)
IMM GRANULOCYTES NFR BLD AUTO: 0.5 %
LYMPHOCYTES # BLD AUTO: 1.26 X10(3)/MCL (ref 0.6–4.6)
LYMPHOCYTES NFR BLD AUTO: 13.1 %
MCH RBC QN AUTO: 20.3 PG (ref 27–31)
MCHC RBC AUTO-ENTMCNC: 29.8 G/DL (ref 33–36)
MCV RBC AUTO: 68.3 FL (ref 80–94)
MONOCYTES # BLD AUTO: 0.93 X10(3)/MCL (ref 0.1–1.3)
MONOCYTES NFR BLD AUTO: 9.7 %
NEUTROPHILS # BLD AUTO: 7.07 X10(3)/MCL (ref 2.1–9.2)
NEUTROPHILS NFR BLD AUTO: 73.5 %
NRBC BLD AUTO-RTO: 0 %
PLATELET # BLD AUTO: 213 X10(3)/MCL (ref 130–400)
PMV BLD AUTO: ABNORMAL FL
RBC # BLD AUTO: 3.69 X10(6)/MCL (ref 4.2–5.4)
WBC # SPEC AUTO: 9.62 X10(3)/MCL (ref 4.5–11.5)

## 2023-06-07 PROCEDURE — 11000001 HC ACUTE MED/SURG PRIVATE ROOM

## 2023-06-07 PROCEDURE — 85025 COMPLETE CBC W/AUTO DIFF WBC: CPT | Performed by: OBSTETRICS & GYNECOLOGY

## 2023-06-07 PROCEDURE — 25000003 PHARM REV CODE 250: Performed by: OBSTETRICS & GYNECOLOGY

## 2023-06-07 RX ORDER — LANOLIN ALCOHOL/MO/W.PET/CERES
1 CREAM (GRAM) TOPICAL DAILY
Status: DISCONTINUED | OUTPATIENT
Start: 2023-06-07 | End: 2023-06-12 | Stop reason: HOSPADM

## 2023-06-07 RX ORDER — SERTRALINE HYDROCHLORIDE 50 MG/1
50 TABLET, FILM COATED ORAL DAILY
Status: DISCONTINUED | OUTPATIENT
Start: 2023-06-07 | End: 2023-06-09

## 2023-06-07 RX ADMIN — PRENATAL VITAMINS-IRON FUMARATE 27 MG IRON-FOLIC ACID 0.8 MG TABLET 1 TABLET: at 09:06

## 2023-06-07 RX ADMIN — SERTRALINE HYDROCHLORIDE 50 MG: 50 TABLET ORAL at 06:06

## 2023-06-07 RX ADMIN — FERROUS SULFATE TAB 325 MG (65 MG ELEMENTAL FE) 1 EACH: 325 (65 FE) TAB at 10:06

## 2023-06-07 RX ADMIN — OXYCODONE HYDROCHLORIDE AND ACETAMINOPHEN 1 TABLET: 10; 325 TABLET ORAL at 06:06

## 2023-06-07 RX ADMIN — SIMETHICONE 80 MG: 80 TABLET, CHEWABLE ORAL at 11:06

## 2023-06-07 RX ADMIN — IBUPROFEN 800 MG: 800 TABLET, FILM COATED ORAL at 06:06

## 2023-06-07 RX ADMIN — IBUPROFEN 800 MG: 800 TABLET, FILM COATED ORAL at 02:06

## 2023-06-07 RX ADMIN — IBUPROFEN 800 MG: 800 TABLET, FILM COATED ORAL at 09:06

## 2023-06-07 RX ADMIN — OXYCODONE HYDROCHLORIDE AND ACETAMINOPHEN 1 TABLET: 10; 325 TABLET ORAL at 11:06

## 2023-06-07 NOTE — PROGRESS NOTES
Spoke with Ade from [facility] to discuss pt and to inquire if their NP wanted to prescribe pt medication for her depression. Ade spoke with NP and called back. NP did not recommend prescribing an antidepressant as pt wishes to breast feed at this time. CM will refer pt to [facility] for outpt follow up in pts home per psych NP.

## 2023-06-07 NOTE — PROGRESS NOTES
[facility]                       [address] [address]    PATIENT NAME:       JEANIE CANADA  YOB: 1995  CSN:                [id number]   MRN:                [id number]  ADMIT DATE:         06/04/2023 01:12:00  PHYSICIAN:          Jarvis Holguin MD                            PROGRESS NOTE    DATE:  06/06/2023 00:00:00    Ms. Canada chart has been reviewed.  Been made aware of her Garrett scale   and elevated depression.  [facility] psychiatric team will be coming to evaluate her   and further management as clinically necessary.  The patient is presently   sleeping.  Also I will return on 06/07/2023 for further rounds and clinical   management.        ______________________________  Jarvis Holguin MD    DCR/AQS  DD:  06/07/2023  Time:  06:31AM  DT:  06/07/2023  Time:  07:46AM  Job #:  [id number]      PROGRESS NOTE

## 2023-06-07 NOTE — PROGRESS NOTES
[facility]                       [address] [address]    PATIENT NAME:       JEANIE CANADA  YOB: 1995  CSN:                [id number]   MRN:                [id number]  ADMIT DATE:         2023 01:12:00  PHYSICIAN:          Jarvis Holguin MD                            PROGRESS NOTE    DATE:  2023 00:00:00    SUMMARY:  Ms. Canada is in postop day #3, status post .  She is   normotensive.  She is afebrile.  Her incision site is within normal limits.     have been to see her to help her with her housing situation.    The patient was recently evicted from her home environment.  The [place] scale   has been reviewed.  The patient has depression.  [facility] has   been to see the patient.  The patient is requesting further analgesics which   includes Percocet.  She is also anemic.  We will administer Icar-C Plus tablets.    She is normotensive and afebrile.  She is passing flatus.  We will have the   patient ambulate.  We will plan to discharge  to home on 2023.        ______________________________  Jarvis Holguin MD    DCR/AQS  DD:  2023  Time:  06:33AM  DT:  2023  Time:  07:54AM  Job #:  [id number]      PROGRESS NOTE

## 2023-06-08 LAB
ABO + RH BLD: NORMAL
ABO + RH BLD: NORMAL
BLD PROD TYP BPU: NORMAL
BLD PROD TYP BPU: NORMAL
BLOOD UNIT EXPIRATION DATE: NORMAL
BLOOD UNIT EXPIRATION DATE: NORMAL
BLOOD UNIT TYPE CODE: 6200
BLOOD UNIT TYPE CODE: 6200
CROSSMATCH INTERPRETATION: NORMAL
CROSSMATCH INTERPRETATION: NORMAL
DISPENSE STATUS: NORMAL
DISPENSE STATUS: NORMAL
UNIT NUMBER: NORMAL
UNIT NUMBER: NORMAL

## 2023-06-08 PROCEDURE — 25000003 PHARM REV CODE 250: Performed by: OBSTETRICS & GYNECOLOGY

## 2023-06-08 PROCEDURE — 11000001 HC ACUTE MED/SURG PRIVATE ROOM

## 2023-06-08 RX ADMIN — SERTRALINE HYDROCHLORIDE 50 MG: 50 TABLET ORAL at 08:06

## 2023-06-08 RX ADMIN — IBUPROFEN 800 MG: 800 TABLET, FILM COATED ORAL at 04:06

## 2023-06-08 RX ADMIN — IBUPROFEN 800 MG: 800 TABLET, FILM COATED ORAL at 11:06

## 2023-06-08 RX ADMIN — OXYCODONE HYDROCHLORIDE AND ACETAMINOPHEN 1 TABLET: 10; 325 TABLET ORAL at 04:06

## 2023-06-08 RX ADMIN — PRENATAL VITAMINS-IRON FUMARATE 27 MG IRON-FOLIC ACID 0.8 MG TABLET 1 TABLET: at 08:06

## 2023-06-08 RX ADMIN — FERROUS SULFATE TAB 325 MG (65 MG ELEMENTAL FE) 1 EACH: 325 (65 FE) TAB at 08:06

## 2023-06-08 RX ADMIN — IBUPROFEN 800 MG: 800 TABLET, FILM COATED ORAL at 07:06

## 2023-06-08 NOTE — PROGRESS NOTES
[facility]                       [address] [address]    PATIENT NAME:       JEANIE CANADA  YOB: 1995  CSN:                [id number]   MRN:                [id number]  ADMIT DATE:         06/04/2023 01:12:00  PHYSICIAN:          Jarvis Holguin MD                            PROGRESS NOTE    DATE:      Ms. Canada is in postop day #4.  I plan to send her home today, but I would   like for the psychiatric team to assess her one more time.  So that we can tie   up any loose ends on when exactly she will be receiving home visits from [place]   psychiatric care facility.  Therefore, I have examined her today.  She is   normotensive and afebrile.  We will take her staples out.  We will discharge her   tomorrow with followup on outpatient basis for psychiatric care, where she will   have home visits from the psychiatric team.        ______________________________  Jarvis Holguin MD    DCR/AQS  DD:  06/08/2023  Time:  04:06PM  DT:  06/08/2023  Time:  06:22PM  Job #:  [id number]      PROGRESS NOTE

## 2023-06-08 NOTE — PROGRESS NOTES
PANTERA Schulz called ROBERTO Mast with questions regarding pt 1:1 status. Kezia called Ade with [facility] who spoke with provider and called back. Lg suggested maintaining 1:1 status overnight and [facility] provider will round on Mom tomorrow to discuss outpt prescriptions and follow up. [facility] provider that is rounding tomorrow is the same provider she will follow up with through the [facility] program. This will allow pt to get established with program staff prior to discharge and allows Mom to communicate with program her preferred contact instructions.This program will make visits to Mom's home to provide care as this will be more accessible for Mom. Dc 1:1 status pending pt visit with Lg tomorrow. Communicated plan to PANTERA Schulz. Mom's OB requests that we receive a definite date for assessment with [facility] prior to discharging pt home to facilitate continuity of care.

## 2023-06-08 NOTE — PLAN OF CARE
Problem: Adult Inpatient Plan of Care  Goal: Plan of Care Review  Outcome: Met  Goal: Patient-Specific Goal (Individualized)  Outcome: Met  Goal: Absence of Hospital-Acquired Illness or Injury  Outcome: Met  Goal: Optimal Comfort and Wellbeing  Outcome: Met  Goal: Readiness for Transition of Care  Outcome: Met     Problem:  Fall Injury Risk  Goal: Absence of Fall, Infant Drop and Related Injury  Outcome: Met     Problem: Infection  Goal: Absence of Infection Signs and Symptoms  Outcome: Met     Problem: Bariatric Environmental Safety  Goal: Safety Maintained with Care  Outcome: Met     Problem: Adjustment to Role Transition (Postpartum  Delivery)  Goal: Successful Maternal Role Transition  Outcome: Met     Problem: Bleeding (Postpartum  Delivery)  Goal: Hemostasis  Outcome: Met     Problem: Infection (Postpartum  Delivery)  Goal: Absence of Infection Signs and Symptoms  Outcome: Met     Problem: Pain (Postpartum  Delivery)  Goal: Acceptable Pain Control  Outcome: Met     Problem: Postoperative Nausea and Vomiting (Postpartum  Delivery)  Goal: Nausea and Vomiting Relief  Outcome: Met     Problem: Postoperative Urinary Retention (Postpartum  Delivery)  Goal: Effective Urinary Elimination  Outcome: Met

## 2023-06-08 NOTE — PROGRESS NOTES
Discussed Mom and social issues with NICU during rounds. Mom has been provided education on Medicaid transportation however Mom still verbalizes difficulty arranging this transportation. Re-educated mom on transportation process. Mom provided with CM contact information should she have further questions. During rounds, Dr. Garcia approved Mom rooming in with baby for 2 nights in NICU. Explained rooming in process to Mom and explained that this is not a guarantee that baby will discharge on Saturday. Mom verbalized understanding and gratitude. CM will arrange Medicaid transportation for Mom on Saturday and transportation for baby's discharge date.

## 2023-06-08 NOTE — NURSING
Discussed plan of care with patient. Patient seems to be feeling much better and is motivated to see her baby. Explained to her that NICU is available at any time that she wants to see her baby. Pt. Stated she will wait until tomorrow to take her juana out. Lg is planning to assess her tomorrow.

## 2023-06-09 PROCEDURE — 25000003 PHARM REV CODE 250: Performed by: NURSE PRACTITIONER

## 2023-06-09 PROCEDURE — 11000001 HC ACUTE MED/SURG PRIVATE ROOM

## 2023-06-09 PROCEDURE — 25000003 PHARM REV CODE 250: Performed by: OBSTETRICS & GYNECOLOGY

## 2023-06-09 RX ORDER — LURASIDONE HYDROCHLORIDE 40 MG/1
40 TABLET, FILM COATED ORAL
Status: DISCONTINUED | OUTPATIENT
Start: 2023-06-09 | End: 2023-06-12 | Stop reason: HOSPADM

## 2023-06-09 RX ORDER — DIPHENHYDRAMINE HCL 25 MG
25 CAPSULE ORAL EVERY 4 HOURS PRN
Status: CANCELLED | OUTPATIENT
Start: 2023-06-09

## 2023-06-09 RX ORDER — OXYTOCIN 10 [USP'U]/ML
10 INJECTION, SOLUTION INTRAMUSCULAR; INTRAVENOUS ONCE AS NEEDED
Status: CANCELLED | OUTPATIENT
Start: 2023-06-09 | End: 2034-11-04

## 2023-06-09 RX ORDER — OXYTOCIN/RINGER'S LACTATE 30/500 ML
95 PLASTIC BAG, INJECTION (ML) INTRAVENOUS ONCE
Status: CANCELLED | OUTPATIENT
Start: 2023-06-09 | End: 2023-06-09

## 2023-06-09 RX ORDER — DOCUSATE SODIUM 100 MG/1
200 CAPSULE, LIQUID FILLED ORAL 2 TIMES DAILY
Status: CANCELLED | OUTPATIENT
Start: 2023-06-09

## 2023-06-09 RX ORDER — CARBOPROST TROMETHAMINE 250 UG/ML
250 INJECTION, SOLUTION INTRAMUSCULAR
Status: CANCELLED | OUTPATIENT
Start: 2023-06-09

## 2023-06-09 RX ORDER — ONDANSETRON 4 MG/1
8 TABLET, ORALLY DISINTEGRATING ORAL EVERY 8 HOURS PRN
Status: CANCELLED | OUTPATIENT
Start: 2023-06-09

## 2023-06-09 RX ORDER — AMOXICILLIN 250 MG
1 CAPSULE ORAL NIGHTLY PRN
Status: CANCELLED | OUTPATIENT
Start: 2023-06-09

## 2023-06-09 RX ORDER — OXYCODONE AND ACETAMINOPHEN 10; 325 MG/1; MG/1
1 TABLET ORAL EVERY 4 HOURS PRN
Status: CANCELLED | OUTPATIENT
Start: 2023-06-09

## 2023-06-09 RX ORDER — MISOPROSTOL 100 UG/1
800 TABLET ORAL ONCE AS NEEDED
Status: CANCELLED | OUTPATIENT
Start: 2023-06-09

## 2023-06-09 RX ORDER — OXYTOCIN/RINGER'S LACTATE 30/500 ML
334 PLASTIC BAG, INJECTION (ML) INTRAVENOUS ONCE AS NEEDED
Status: CANCELLED | OUTPATIENT
Start: 2023-06-09 | End: 2034-11-04

## 2023-06-09 RX ORDER — METHYLERGONOVINE MALEATE 0.2 MG/ML
200 INJECTION INTRAVENOUS
Status: CANCELLED | OUTPATIENT
Start: 2023-06-09

## 2023-06-09 RX ORDER — MISOPROSTOL 100 UG/1
800 TABLET ORAL ONCE AS NEEDED
Status: CANCELLED | OUTPATIENT
Start: 2023-06-09 | End: 2034-11-04

## 2023-06-09 RX ORDER — SODIUM CHLORIDE 0.9 % (FLUSH) 0.9 %
10 SYRINGE (ML) INJECTION
Status: CANCELLED | OUTPATIENT
Start: 2023-06-09

## 2023-06-09 RX ORDER — SIMETHICONE 80 MG
1 TABLET,CHEWABLE ORAL EVERY 6 HOURS PRN
Status: CANCELLED | OUTPATIENT
Start: 2023-06-09

## 2023-06-09 RX ORDER — PROCHLORPERAZINE EDISYLATE 5 MG/ML
5 INJECTION INTRAMUSCULAR; INTRAVENOUS EVERY 6 HOURS PRN
Status: CANCELLED | OUTPATIENT
Start: 2023-06-09

## 2023-06-09 RX ORDER — ADHESIVE BANDAGE
30 BANDAGE TOPICAL 2 TIMES DAILY PRN
Status: CANCELLED | OUTPATIENT
Start: 2023-06-10

## 2023-06-09 RX ORDER — MUPIROCIN 20 MG/G
OINTMENT TOPICAL 2 TIMES DAILY
Status: CANCELLED | OUTPATIENT
Start: 2023-06-09 | End: 2023-06-14

## 2023-06-09 RX ORDER — OXYTOCIN/RINGER'S LACTATE 30/500 ML
95 PLASTIC BAG, INJECTION (ML) INTRAVENOUS ONCE AS NEEDED
Status: CANCELLED | OUTPATIENT
Start: 2023-06-09 | End: 2034-11-04

## 2023-06-09 RX ORDER — PRENATAL WITH FERROUS FUM AND FOLIC ACID 3080; 920; 120; 400; 22; 1.84; 3; 20; 10; 1; 12; 200; 27; 25; 2 [IU]/1; [IU]/1; MG/1; [IU]/1; MG/1; MG/1; MG/1; MG/1; MG/1; MG/1; UG/1; MG/1; MG/1; MG/1; MG/1
1 TABLET ORAL DAILY
Status: CANCELLED | OUTPATIENT
Start: 2023-06-09

## 2023-06-09 RX ORDER — BISACODYL 10 MG
10 SUPPOSITORY, RECTAL RECTAL ONCE AS NEEDED
Status: CANCELLED | OUTPATIENT
Start: 2023-06-09 | End: 2034-11-04

## 2023-06-09 RX ADMIN — IBUPROFEN 800 MG: 800 TABLET, FILM COATED ORAL at 10:06

## 2023-06-09 RX ADMIN — IBUPROFEN 800 MG: 800 TABLET, FILM COATED ORAL at 04:06

## 2023-06-09 RX ADMIN — FERROUS SULFATE TAB 325 MG (65 MG ELEMENTAL FE) 1 EACH: 325 (65 FE) TAB at 10:06

## 2023-06-09 RX ADMIN — PRENATAL VITAMINS-IRON FUMARATE 27 MG IRON-FOLIC ACID 0.8 MG TABLET 1 TABLET: at 10:06

## 2023-06-09 RX ADMIN — IBUPROFEN 800 MG: 800 TABLET, FILM COATED ORAL at 02:06

## 2023-06-09 RX ADMIN — SERTRALINE HYDROCHLORIDE 50 MG: 50 TABLET ORAL at 10:06

## 2023-06-09 RX ADMIN — LURASIDONE HYDROCHLORIDE 40 MG: 40 TABLET, FILM COATED ORAL at 10:06

## 2023-06-09 RX ADMIN — OXYCODONE HYDROCHLORIDE AND ACETAMINOPHEN 1 TABLET: 10; 325 TABLET ORAL at 11:06

## 2023-06-09 NOTE — NURSING
Nurse Practitioner told me she recommends discontinuing 1 on 1 status as pt is not a harm to herself anymore. Called Dr. Holguin to let him know and he stated whatever is in writing according to the NP's note is what we should go by. Pt will remain 1 on 1 until morning.

## 2023-06-09 NOTE — PROGRESS NOTES
"Inpatient Nutrition Evaluation    Admit Date: 2023   Total duration of encounter: 5 days    Nutrition Recommendation/Prescription     Continue regular diet as tolerated  RD to monitor po intake and weight changes    Nutrition Assessment     Chart Review    Reason Seen: length of stay    Malnutrition Screening Tool Results   Have you recently lost weight without trying?: No  Have you been eating poorly because of a decreased appetite?: No   MST Score: 0     Diagnosis:  POD 5 s/p     Relevant Medical History: 3 previous c-sections    Nutrition-Related Medications: ferrous sulfate, Zofran PRN, prenatal, senna    Nutrition-Related Labs: no new labs      Diet Order: Diet Adult Regular  Oral Supplement Order: none  Appetite/Oral Intake: good/% of meals  Factors Affecting Nutritional Intake: none identified  Food/Rastafari/Cultural Preferences: unable to obtain  Food Allergies: unable to obtain       Wound(s):       Comments    : assessment inappropriate at this time, will conduct upon follow up. Per notes, pt with good appetite, no GI issues reported. Per MST, no decreased appetite or weight loss prior admission. Weight of 113.9 kg (251 lb) on  and weight 1 year ago of 215 lb.    Anthropometrics    Height: 5' 2" (157.5 cm) Height Method: Measured  Last Weight: 113.9 kg (251 lb) (23 0131) Weight Method: Standard Scale  BMI (Calculated): 45.9  BMI Classification: obese grade III (BMI >/=40)     Ideal Body Weight (IBW), Female: 110 lb     % Ideal Body Weight, Female (lb): 228.18 %                             Usual Weight Provided By: EMR weight history    Wt Readings from Last 5 Encounters:   23 113.9 kg (251 lb)   22 97.5 kg (215 lb)     Weight Change(s) Since Admission:  Admit Weight: 113.9 kg (251 lb) (23 0131)      Patient Education    Not applicable.    Monitoring & Evaluation     Dietitian will monitor food and beverage intake and weight change.  Nutrition " Risk/Follow-Up: low (follow-up in 5-7 days)  Patients assigned 'low nutrition risk' status do not qualify for a full nutritional assessment but will be monitored and re-evaluated in a 5-7 day time period. Please consult if re-evaluation needed sooner.
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
## 2023-06-09 NOTE — NURSING
"Dr. Holguin was notified of patient's current status and situation with DCFS. Dr. Holguin stated "I put in her discharge orders at 2 AM and I am not comfortable with her being discharged now. I would like her to stay until Monday if possible", "I am fine with ordering a consult to [facility]", "I would like to put her as a 1 to 1 for the time being". Spoke with case management about possible placement in a women's/homeless shelter. Smitha Hewitt explained to me that there is no availability at shelters. Will contact [facility] to reevaluate and assess patient for possible placement at their facility. Patient not being discharged at this time and evaluation continued. Also notified physician about patient's opening on left side of incision.   "
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
## 2023-06-09 NOTE — NURSING
Spoke with answering service for [facility] and gave the order for a evaluation/assessment for the patient at [phone].

## 2023-06-09 NOTE — NURSING
"NICU notified me of patient's conversation with the nurse Kate. Kate explained to me that the patient stated to her "No one came talk to me today and no one told me that I cannot see my baby" and patient stated "this is going to kill me". Patient is currently with a one-to-one sitter and is waiting evaluation from [facility]    Smitha Hewitt notified me of [facility] suggesting a source called "[facility]". However we both expressed that patient has no current home to have these at-home visits.   "

## 2023-06-09 NOTE — NURSING
"Patient stated on phone with police "this is why people kill themselves". Will notify physician about patient's comment.   "

## 2023-06-09 NOTE — NURSING
"[facility]'s NP Radha Barbosa, stated "Increase her Zoloft to 75 mg, order Latuda 40 mg after her evening meal, and she will be evaluated this weekend". Orders were placed and patient will be notified of change to medications  "
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
## 2023-06-09 NOTE — NURSING
Removed 15 staples from patient's incision. Applied steri strips.The left side of the incision is open and moist. Explained to patient that she needs to keep her incisional area as dry as possible.

## 2023-06-10 PROCEDURE — 25000003 PHARM REV CODE 250: Performed by: NURSE PRACTITIONER

## 2023-06-10 PROCEDURE — 63600175 PHARM REV CODE 636 W HCPCS: Performed by: OBSTETRICS & GYNECOLOGY

## 2023-06-10 PROCEDURE — 25000003 PHARM REV CODE 250: Performed by: OBSTETRICS & GYNECOLOGY

## 2023-06-10 PROCEDURE — 11000001 HC ACUTE MED/SURG PRIVATE ROOM

## 2023-06-10 RX ORDER — CEFTRIAXONE 250 MG/1
1000 INJECTION, POWDER, FOR SOLUTION INTRAMUSCULAR; INTRAVENOUS
Status: CANCELLED | OUTPATIENT
Start: 2023-06-10 | End: 2023-06-11

## 2023-06-10 RX ORDER — LIDOCAINE HYDROCHLORIDE 10 MG/ML
1 INJECTION, SOLUTION EPIDURAL; INFILTRATION; INTRACAUDAL; PERINEURAL ONCE
Status: DISCONTINUED | OUTPATIENT
Start: 2023-06-10 | End: 2023-06-10

## 2023-06-10 RX ORDER — CEFTRIAXONE 250 MG/1
1000 INJECTION, POWDER, FOR SOLUTION INTRAMUSCULAR; INTRAVENOUS
Status: DISCONTINUED | OUTPATIENT
Start: 2023-06-10 | End: 2023-06-10

## 2023-06-10 RX ORDER — CEFTRIAXONE 250 MG/1
1000 INJECTION, POWDER, FOR SOLUTION INTRAMUSCULAR; INTRAVENOUS
Status: COMPLETED | OUTPATIENT
Start: 2023-06-10 | End: 2023-06-10

## 2023-06-10 RX ORDER — LIDOCAINE HYDROCHLORIDE 10 MG/ML
4 INJECTION, SOLUTION EPIDURAL; INFILTRATION; INTRACAUDAL; PERINEURAL ONCE
Status: COMPLETED | OUTPATIENT
Start: 2023-06-10 | End: 2023-06-10

## 2023-06-10 RX ADMIN — OXYCODONE HYDROCHLORIDE AND ACETAMINOPHEN 1 TABLET: 5; 325 TABLET ORAL at 09:06

## 2023-06-10 RX ADMIN — LURASIDONE HYDROCHLORIDE 40 MG: 40 TABLET, FILM COATED ORAL at 08:06

## 2023-06-10 RX ADMIN — LIDOCAINE HYDROCHLORIDE 20 MG: 10 INJECTION, SOLUTION EPIDURAL; INFILTRATION; INTRACAUDAL; PERINEURAL at 03:06

## 2023-06-10 RX ADMIN — SERTRALINE HYDROCHLORIDE 75 MG: 50 TABLET ORAL at 09:06

## 2023-06-10 RX ADMIN — IBUPROFEN 800 MG: 800 TABLET, FILM COATED ORAL at 01:06

## 2023-06-10 RX ADMIN — CEFTRIAXONE SODIUM 1000 MG: 1 INJECTION, POWDER, FOR SOLUTION INTRAMUSCULAR; INTRAVENOUS at 03:06

## 2023-06-10 RX ADMIN — PRENATAL VITAMINS-IRON FUMARATE 27 MG IRON-FOLIC ACID 0.8 MG TABLET 1 TABLET: at 09:06

## 2023-06-10 RX ADMIN — IBUPROFEN 800 MG: 800 TABLET, FILM COATED ORAL at 05:06

## 2023-06-10 RX ADMIN — FERROUS SULFATE TAB 325 MG (65 MG ELEMENTAL FE) 1 EACH: 325 (65 FE) TAB at 09:06

## 2023-06-10 RX ADMIN — IBUPROFEN 800 MG: 800 TABLET, FILM COATED ORAL at 10:06

## 2023-06-10 NOTE — PLAN OF CARE
Problem: Bleeding ( Delivery)  Goal: Bleeding is Controlled  Outcome: Ongoing, Progressing     Problem: Change in Fetal Wellbeing ( Delivery)  Goal: Stable Fetal Wellbeing  Outcome: Ongoing, Progressing     Problem: Infection ( Delivery)  Goal: Absence of Infection Signs and Symptoms  Outcome: Ongoing, Progressing     Problem: Respiratory Compromise ( Delivery)  Goal: Effective Oxygenation and Ventilation  Outcome: Ongoing, Progressing     Patient pumping.

## 2023-06-10 NOTE — PROGRESS NOTES
[facility]                       [address] [id number]    PATIENT NAME:       JEANIE CANADA  YOB: 1995  CSN:                [id number]   MRN:                [id number]  ADMIT DATE:         2023 01:12:00  PHYSICIAN:          Jarvis Holguin MD                            PROGRESS NOTE    DATE:  2023 00:00:00    Ms. Canada is in postop day #5, status post  section.  She has a   wound infection at this time.  We will maintain her admission status and order   wound care therapy and further  for this patient.    Please place this progress note on the chart of the patient.        ______________________________  Jarvis Holguin MD    DCR/AQS  DD:  06/10/2023  Time:  12:22AM  DT:  06/10/2023  Time:  12:54AM  Job #:  [id number]      PROGRESS NOTE

## 2023-06-10 NOTE — NURSING
rounded on pt. Told him of pt's incision area starting to smell and that it is moist. states he wants a wound care consult on pt, one time dose of rocephin and to clean wound with hydrogen peroxide.

## 2023-06-10 NOTE — PROGRESS NOTES
[facility]                       [address] [address]    PATIENT NAME:       JEANIE CANADA  YOB: 1995  CSN:                [id number]   MRN:                [id number]  ADMIT DATE:         06/04/2023 01:12:00  PHYSICIAN:          Jarvis Holguin MD                            PROGRESS NOTE    DATE:  06/10/2023 00:00:00    Ms. Canada is in postop day #6.  She is complaining of further depression   secondary to an incident that happened with the Department of Children [employer]   Family Services awarding her infant to the [facility].  Her incision site has been   evaluated and there is an arid aroma associated with visualizing the area.  The   dermis is still intact.  The fascia is intact and appears that she may have a   low-grade wound infection.  I am going to administer Rocephin 1 g IM and consult   with Wound Care therapy for this patient and further management as clinically   necessary.  Please place this progress note on the chart of the patient.        ______________________________  Jarvis Holguin MD    DCR/AQS  DD:  06/10/2023  Time:  12:24AM  DT:  06/10/2023  Time:  12:37AM  Job #:  [id number]      PROGRESS NOTE

## 2023-06-11 PROCEDURE — 25000003 PHARM REV CODE 250: Performed by: NURSE PRACTITIONER

## 2023-06-11 PROCEDURE — 25000003 PHARM REV CODE 250: Performed by: OBSTETRICS & GYNECOLOGY

## 2023-06-11 PROCEDURE — 11000001 HC ACUTE MED/SURG PRIVATE ROOM

## 2023-06-11 RX ADMIN — FERROUS SULFATE TAB 325 MG (65 MG ELEMENTAL FE) 1 EACH: 325 (65 FE) TAB at 08:06

## 2023-06-11 RX ADMIN — LURASIDONE HYDROCHLORIDE 40 MG: 40 TABLET, FILM COATED ORAL at 10:06

## 2023-06-11 RX ADMIN — IBUPROFEN 800 MG: 800 TABLET, FILM COATED ORAL at 04:06

## 2023-06-11 RX ADMIN — SERTRALINE HYDROCHLORIDE 75 MG: 50 TABLET ORAL at 08:06

## 2023-06-11 RX ADMIN — PRENATAL VITAMINS-IRON FUMARATE 27 MG IRON-FOLIC ACID 0.8 MG TABLET 1 TABLET: at 08:06

## 2023-06-11 RX ADMIN — IBUPROFEN 800 MG: 800 TABLET, FILM COATED ORAL at 08:06

## 2023-06-11 NOTE — PSYCH
Confirmed with nurse/covering pt is taking Zoloft 50mg daily. Was breastfeeding/intending on continuing breastfeeding while having access to infant in NICU (presently forbidden access to infant at present, new development). Compliant with meds PO at present. Zoloft increased to 75mg daily, started Latuda 40mg Qevening with food. Psych cont to follow over weekend.

## 2023-06-11 NOTE — PROGRESS NOTES
[facility]                       [address] [id number]    PATIENT NAME:       JEANIE CANADA  YOB: 1995  CSN:                [id number]   MRN:                [id number]  ADMIT DATE:         2023 01:12:00  PHYSICIAN:          Jarvis Holguin MD                            PROGRESS NOTE    DATE:      SUBJECTIVE:  Ms. Canada is in postop day #7, status post  section who   has been dealing with depression and anxiety.  She is improving today.  The   psychiatric team has been to see her.  We will attempt to plan for discharge on   tomorrow.          ______________________________  Jarvis Holguin MD    DCR/AQS  DD:  2023  Time:  01:10PM  DT:  2023  Time:  03:32PM  Job #:  [id number]      PROGRESS NOTE

## 2023-06-11 NOTE — PROGRESS NOTES
"6/10/2023 8:11 PM   Tita Canada   1995   [id number]       Psychiatry Consult Progress Note     SUBJECTIVE:   Tita Canada is a 28 y.o. female s/p CS day #6. Psych is following up for depression and SI. She is awake and alert, sitting up in bed. She has a one to one sitter at her bedside. She is pending discharge home as she awaits wound care therapy. She denies SI today. She reports improved symptoms today but admits "I missed my children". Sleep has improved. Appetite remains poor.  She reports medication compliance and no side effects reported.     Current Medications:   Scheduled Meds:    ferrous sulfate  1 tablet Oral Daily    ibuprofen  800 mg Oral Q8H    lurasidone  40 mg Oral After dinner    prenatal vitamin  1 tablet Oral Daily    sertraline  75 mg Oral Daily      PRN Meds: bisacodyL, carboprost, diphenhydrAMINE, diphenhydrAMINE, lanolin, magnesium hydroxide 400 mg/5 ml, methylergonovine, miSOPROStoL, morphine, ondansetron, oxyCODONE-acetaminophen, oxyCODONE-acetaminophen, oxyCODONE-acetaminophen, oxytocin in lactated ringers, oxytocin in lactated ringers, oxytocin, prochlorperazine, senna-docusate 8.6-50 mg, simethicone, sodium chloride 0.9%, tranexamic acid (CYKLOKAPRON) infusion   Psychotherapeutics (From admission, onward)      Start     Stop Route Frequency Ordered    06/10/23 0900  sertraline tablet 75 mg         -- Oral Daily 06/09/23 1758    06/09/23 1700  lurasidone tablet 40 mg         -- Oral After dinner 06/09/23 1757          Allergies:   Review of patient's allergies indicates:  No Known Allergies     OBJECTIVE:   Vitals   Vitals:    06/10/23 1948   BP: 132/73   Pulse: 69   Resp: 18   Temp: 98.9 °F (37.2 °C)        Labs/Imaging/Studies:   No results found for this or any previous visit (from the past 36 hour(s)).     Medical Review Of Systems:  Behavioral/Psych: positive for anxiety and depression    Psychiatric Mental Status Exam:  General Appearance: dressed in hospital garb, " lying in bed, in no acute distress  Arousal: alert with clear sensorium  Behavior: normal; cooperative; reasonably friendly, pleasant, and polite; appropriate eye-contact; under good behavioral control  Movements and Motor Activity: no abnormal involuntary movements noted; no tics, no tremors, no akathisia, no dystonia, no evidence of tardive dyskinesia; no psychomotor agitation or retardation  Orientation: intact; oriented fully to person, place, time and situation  Speech: intact; normal rate, rhythm, volume, tone and pitch; conversational, spontaneous, and coherent  Mood: Depressed and Guarded  Affect: mood-congruent, dysthymic, anxious  Thought Process: linear  Associations: intact  Thought Content and Perceptions: no auditory or visual hallucinations, no paranoid ideation, no ideas of reference, no evidence of delusions or psychosis  Recent and Remote Memory: intact  Attention and Concentration: attentive to conversation  Fund of Knowledge: intact  Insight: adequate  Judgment: adequate    ASSESSMENT/PLAN:   Diagnosis:MOOD DISORDERS; Major Depressive Disorder, Recurrent: Severe Without Psychotic Features (F33.2) and ANXIETY DISORDERS; 7.9.Generalized Anxiety Disorder (F41.1)     No past medical history on file.     Recommendations:  Continue current medication and treatment  plan    Liana Ruiz

## 2023-06-12 VITALS
BODY MASS INDEX: 46.19 KG/M2 | HEART RATE: 70 BPM | DIASTOLIC BLOOD PRESSURE: 81 MMHG | WEIGHT: 251 LBS | HEIGHT: 62 IN | SYSTOLIC BLOOD PRESSURE: 146 MMHG | TEMPERATURE: 99 F | RESPIRATION RATE: 18 BRPM | OXYGEN SATURATION: 100 %

## 2023-06-12 PROCEDURE — 25000003 PHARM REV CODE 250: Performed by: NURSE PRACTITIONER

## 2023-06-12 PROCEDURE — 25000003 PHARM REV CODE 250: Performed by: OBSTETRICS & GYNECOLOGY

## 2023-06-12 RX ADMIN — ONDANSETRON 8 MG: 4 TABLET, ORALLY DISINTEGRATING ORAL at 06:06

## 2023-06-12 RX ADMIN — IBUPROFEN 800 MG: 800 TABLET, FILM COATED ORAL at 12:06

## 2023-06-12 RX ADMIN — SERTRALINE HYDROCHLORIDE 75 MG: 50 TABLET ORAL at 08:06

## 2023-06-12 RX ADMIN — LURASIDONE HYDROCHLORIDE 40 MG: 40 TABLET, FILM COATED ORAL at 05:06

## 2023-06-12 RX ADMIN — IBUPROFEN 800 MG: 800 TABLET, FILM COATED ORAL at 04:06

## 2023-06-12 RX ADMIN — IBUPROFEN 800 MG: 800 TABLET, FILM COATED ORAL at 08:06

## 2023-06-12 RX ADMIN — PRENATAL VITAMINS-IRON FUMARATE 27 MG IRON-FOLIC ACID 0.8 MG TABLET 1 TABLET: at 08:06

## 2023-06-12 RX ADMIN — FERROUS SULFATE TAB 325 MG (65 MG ELEMENTAL FE) 1 EACH: 325 (65 FE) TAB at 08:06

## 2023-06-12 NOTE — PROGRESS NOTES
Met with pt in postpartum room. Mom appeared appropriate and cooperative. Mom continues to be 1:1 status and CNA present. Mom reported that she is anticipating discharge today. Spoke with mom about any friend or family involvement and she denied. Inquired if she had a specific address or area that we could coordinate transportation for her. Mom reported that she needed to  a few things from her mothers trailer and that would be a sufficient place to drop her off at. CM verbalized understanding. Communicated that [facility], [facility], [facility], and [facility] [place] have no beds available at this time. Both [facility] and [facility] require that children need to be with their mother for placement opportunities. CNA and floor  called multiple shelters found on [place] with no success. Provided Mom with list of shelters/transitional housing contact information for her future use. Explained that referral for outpatient case management through the [facility] was made 6/6/2023 and provided her contact information to the agency. Explained that referral to [facility] was made 6/6/2023 to assist in Mom receiving a government phone, permanent housing, employment, enrollment in state programs, and basic supplies. Provided Mom with contact information to this agency as well. Explained again that the National Maternal Mental Health hotline is accessible 24/7 and is able to follow up with her for outpatient mental health counseling and Rx medications. Per psych consult with [facility], referrals to [facility] and [facility] for mental health follow up were completed and sent. CM provided emotional support to Mom throughout visit and encouragement through her situation. Mom verbalized understanding of everything covered during visit. Mom denied any further questions at this time and expressed gratitude for the support and assistance provided. Medicaid  transportation or Uber will be arranged for Mom at discharge to drive her to [address] [place] [address]. [address] [id number].

## 2023-06-12 NOTE — NURSING
Patient received all prescribed discharge meds except for her Latuda which has been ordered by our outpatient pharmacy and should arrive by 12pm tomorrow(6/13). Patient received her PM latuda dose at hospital at 1743 prior to discharge. Patient verbalized that a [place] Bl[place] or [place] should be able to  her prescribed Latuda for her and have it brought to her.

## 2023-06-12 NOTE — PROGRESS NOTES
6/11/2023 7:23 PM   Tita Canada   1995   [id number]       Psychiatry Consult Progress Note     SUBJECTIVE:   Tita Canada is a 28 y.o. female being followed up for depression and SI. She is  status post CS day #7.  Patient is awake and alert with one to one sitter at her bedside. She is reading her bible and making notes as she reads. She admits feeling depressed today because the new born was discharged home with a family member. She ruminates over not seeing the baby and missing her other children. She discusses her fear of lack of housing once discharged home and she hopes she could be assisted with housing. She  continues to endorse sadness, She denies SI. Sleep has improved and her appetite is fair. She reports medication compliance and no side effects reported to isabel Latuda and increased Sertraline from 6/10. She is pending AM discharge per attending obstetrician.     Current Medications:   Scheduled Meds:    ferrous sulfate  1 tablet Oral Daily    ibuprofen  800 mg Oral Q8H    lurasidone  40 mg Oral After dinner    prenatal vitamin  1 tablet Oral Daily    sertraline  75 mg Oral Daily      PRN Meds: bisacodyL, carboprost, diphenhydrAMINE, diphenhydrAMINE, lanolin, magnesium hydroxide 400 mg/5 ml, methylergonovine, miSOPROStoL, morphine, ondansetron, oxyCODONE-acetaminophen, oxyCODONE-acetaminophen, oxyCODONE-acetaminophen, oxytocin in lactated ringers, oxytocin in lactated ringers, oxytocin, prochlorperazine, senna-docusate 8.6-50 mg, simethicone, sodium chloride 0.9%, tranexamic acid (CYKLOKAPRON) infusion   Psychotherapeutics (From admission, onward)      Start     Stop Route Frequency Ordered    06/10/23 0900  sertraline tablet 75 mg         -- Oral Daily 06/09/23 1758    06/09/23 1700  lurasidone tablet 40 mg         -- Oral After dinner 06/09/23 1757          Allergies:   Review of patient's allergies indicates:  No Known Allergies     OBJECTIVE:   Vitals   Vitals:    06/11/23 1539   BP:  (!) 177/93   Pulse: 65   Resp: 17   Temp: 98.9 °F (37.2 °C)        Labs/Imaging/Studies:   No results found for this or any previous visit (from the past 36 hour(s)).     Psychiatric Mental Status Exam:  General Appearance: dressed in hospital garb, lying in bed  Arousal: alert with clear sensorium  Behavior: polite, appropriate eye-contact  Movements and Motor Activity: no abnormal involuntary movements noted; no tics, no tremors, no akathisia, no dystonia, no evidence of tardive dyskinesia; no psychomotor agitation or retardation  Orientation: oriented to person, place, time, and situation  Speech: soft  Mood: Depressed  Affect: mood-congruent, flat  Thought Process: linear  Associations: no loosening of associations  Thought Content and Perceptions: no suicidal or homicidal ideation, no auditory or visual hallucinations, no paranoid ideation, no ideas of reference, no evidence of delusions or psychosis  Recent and Remote Memory: intact  Attention and Concentration: intact  Fund of Knowledge: intact  Insight: intact  Judgment: intact    ASSESSMENT/PLAN:   Diagnosis:  MOOD DISORDERS; Major Depressive Disorder, Recurrent: Severe Without Psychotic Features (F33.2) and ANXIETY DISORDERS; 7.9.Generalized Anxiety Disorder (F41.1)     No past medical history on file.     Recommendations:  SW assist with disposition: Patient expresses concern over lack of housing  Outpatient medication management and psychotherapy to prevent decompensation  Continue current medication.  Psych will follow until discharge.    Liana Ruiz

## 2023-06-12 NOTE — PROGRESS NOTES
"6/12/2023  Tita Canada   1995   [id number]        Psychiatry Progress Note     Chief Complaint: "I am leaving today. I guess I'll figure out where to go. I can't go back to the trailer".    SUBJECTIVE:   Tita Canada is a 28 y.o. female female being followed up for depression and SI. She is  status post CS day #8. She reports she is concerned about her losing her children. She reports her brother and sister called DCFS on her kids. She reports her oldest's children GM has temporary custody of her 6 children. She reports she has to find a place to live to gain custody again. She denies thoughts to harm self and others.She is sleeping well. Appetite and energy is good. Med compliant with no side effects.     Current Medications:   Scheduled Meds:    ferrous sulfate  1 tablet Oral Daily    ibuprofen  800 mg Oral Q8H    lurasidone  40 mg Oral After dinner    prenatal vitamin  1 tablet Oral Daily    sertraline  75 mg Oral Daily      PRN Meds: bisacodyL, carboprost, diphenhydrAMINE, diphenhydrAMINE, lanolin, magnesium hydroxide 400 mg/5 ml, methylergonovine, miSOPROStoL, morphine, ondansetron, oxyCODONE-acetaminophen, oxyCODONE-acetaminophen, oxyCODONE-acetaminophen, oxytocin in lactated ringers, oxytocin in lactated ringers, oxytocin, prochlorperazine, senna-docusate 8.6-50 mg, simethicone, sodium chloride 0.9%, tranexamic acid (CYKLOKAPRON) infusion   Psychotherapeutics (From admission, onward)      Start     Stop Route Frequency Ordered    06/10/23 0900  sertraline tablet 75 mg         -- Oral Daily 06/09/23 1758    06/09/23 1700  lurasidone tablet 40 mg         -- Oral After dinner 06/09/23 1757            Allergies:   Review of patient's allergies indicates:  No Known Allergies     OBJECTIVE:   Vitals   Vitals:    06/12/23 1632   BP: (!) 146/81   Pulse: 70   Resp: 18   Temp: 99 °F (37.2 °C)        Labs/Imaging/Studies:   No results found for this or any previous visit (from the past 36 hour(s)). "       Psychiatric Mental Status Exam:  General Appearance: dressed in hospital garb, lying in bed  Arousal: alert with clear sensorium  Behavior: polite, appropriate eye-contact  Movements and Motor Activity: no abnormal involuntary movements noted; no tics, no tremors, no akathisia, no dystonia, no evidence of tardive dyskinesia; no psychomotor agitation or retardation  Orientation: oriented to person, place, time, and situation  Speech: soft  Mood: Less depressed  Affect: mood-congruent, flat  Thought Process: linear  Associations: no loosening of associations  Thought Content and Perceptions: no suicidal or homicidal ideation, no auditory or visual hallucinations, no paranoid ideation, no ideas of reference, no evidence of delusions or psychosis  Recent and Remote Memory: intact  Attention and Concentration: intact  Fund of Knowledge: intact  Insight: intact  Judgment: intact     ASSESSMENT/PLAN:   Problems Addressed/Diagnoses:  MOOD DISORDERS; Major Depressive Disorder, Recurrent: Severe Without Psychotic Features (F33.2) and ANXIETY DISORDERS; 7.9.Generalized Anxiety Disorder (F41.1)      No past medical history on file.      Plan:  D/C on today  Aftercare with Dav[facility] in [facility] or [facility] in [place]             Jennie Resendez

## 2023-06-13 ENCOUNTER — PATIENT OUTREACH (OUTPATIENT)
Dept: ADMINISTRATIVE | Facility: CLINIC | Age: 28
End: 2023-06-13
Payer: MEDICAID
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
## 2023-07-10 ENCOUNTER — HOSPITAL ENCOUNTER (EMERGENCY)
Facility: HOSPITAL | Age: 28
Discharge: HOME | End: 2023-07-10
Attending: EMERGENCY MEDICINE
Payer: MEDICAID

## 2023-07-10 VITALS
BODY MASS INDEX: 38.83 KG/M2 | OXYGEN SATURATION: 98 % | TEMPERATURE: 99 F | WEIGHT: 211 LBS | DIASTOLIC BLOOD PRESSURE: 92 MMHG | SYSTOLIC BLOOD PRESSURE: 142 MMHG | HEIGHT: 62 IN | HEART RATE: 71 BPM | RESPIRATION RATE: 18 BRPM

## 2023-07-10 DIAGNOSIS — K02.9 PAIN DUE TO DENTAL CARIES: ICD-10-CM

## 2023-07-10 DIAGNOSIS — K04.7 DENTAL ABSCESS: Primary | ICD-10-CM

## 2023-07-10 PROCEDURE — 25000003 PHARM REV CODE 250: Performed by: PHYSICIAN ASSISTANT

## 2023-07-10 PROCEDURE — 99284 EMERGENCY DEPT VISIT MOD MDM: CPT

## 2023-07-10 RX ORDER — CHLORHEXIDINE GLUCONATE ORAL RINSE 1.2 MG/ML
15 SOLUTION DENTAL 2 TIMES DAILY
Qty: 400 ML | Refills: 0 | Status: SHIPPED | OUTPATIENT
Start: 2023-07-10 | End: 2023-07-24

## 2023-07-10 RX ORDER — AMOXICILLIN AND CLAVULANATE POTASSIUM 875; 125 MG/1; MG/1
1 TABLET, FILM COATED ORAL 2 TIMES DAILY
Qty: 20 TABLET | Refills: 0 | Status: SHIPPED | OUTPATIENT
Start: 2023-07-10 | End: 2023-07-20

## 2023-07-10 RX ORDER — SERTRALINE HYDROCHLORIDE 25 MG/1
75 TABLET, FILM COATED ORAL DAILY
COMMUNITY
Start: 2023-06-12

## 2023-07-10 RX ORDER — OXYCODONE AND ACETAMINOPHEN 10; 325 MG/1; MG/1
1 TABLET ORAL EVERY 8 HOURS PRN
COMMUNITY
Start: 2023-06-12

## 2023-07-10 RX ORDER — HYDROCODONE BITARTRATE AND ACETAMINOPHEN 7.5; 325 MG/1; MG/1
1 TABLET ORAL EVERY 8 HOURS PRN
Qty: 15 TABLET | Refills: 0 | Status: SHIPPED | OUTPATIENT
Start: 2023-07-10 | End: 2023-07-15

## 2023-07-10 RX ORDER — LURASIDONE HYDROCHLORIDE 40 MG/1
40 TABLET, FILM COATED ORAL NIGHTLY
COMMUNITY
Start: 2023-06-12

## 2023-07-10 RX ORDER — HYDROCODONE BITARTRATE AND ACETAMINOPHEN 7.5; 325 MG/1; MG/1
1 TABLET ORAL ONCE
Status: COMPLETED | OUTPATIENT
Start: 2023-07-10 | End: 2023-07-10

## 2023-07-10 RX ORDER — AMOXICILLIN AND CLAVULANATE POTASSIUM 875; 125 MG/1; MG/1
1 TABLET, FILM COATED ORAL
Status: COMPLETED | OUTPATIENT
Start: 2023-07-10 | End: 2023-07-10

## 2023-07-10 RX ADMIN — AMOXICILLIN AND CLAVULANATE POTASSIUM 1 TABLET: 875; 125 TABLET ORAL at 07:07

## 2023-07-10 RX ADMIN — HYDROCODONE BITARTRATE AND ACETAMINOPHEN 1 TABLET: 7.5; 325 TABLET ORAL at 07:07

## 2023-07-10 NOTE — Clinical Note
"Tita Canada (Mariya) was seen and treated in our emergency department on 7/10/2023.  She may return to work on 07/11/2023.       If you have any questions or concerns, please don't hesitate to call.      Kingsley Roman PA-C"

## 2023-07-11 NOTE — ED PROVIDER NOTES
Encounter Date: 7/10/2023       History     Chief Complaint   Patient presents with    Dental Pain     C/o right lower dental pain with facial swelling since yesterday, denies known fever.     20-year-old female with a history of depression presents to ED with right lower dental pain that began yesterday.  Notes she woke up this morning and had swelling to the right lower jaw region.  Notes that her tooth has a hole in it has been meaning to go to a dentist for few months however notes that a lot has gone on she is been able to get an appointment.  States she would a baby a month ago and almost in the homeless shelter.  Notes limited resource.  Denies fever, chills, difficulty swallowing, nausea, vomiting.  States while sitting in the waiting room, she felt as though the abscess in her mouth opened and she can taste it.    The history is provided by the patient. No  was used.   Review of patient's allergies indicates:  No Known Allergies  Past Medical History:   Diagnosis Date    Depression      Past Surgical History:   Procedure Laterality Date     SECTION       SECTION N/A 2023    Procedure:  SECTION;  Surgeon: Jarvis Holguin MD;  Location: [facility];  Service: OB/GYN;  Laterality: N/A;     History reviewed. No pertinent family history.  Social History     Tobacco Use    Smoking status: Never    Smokeless tobacco: Never   Substance Use Topics    Alcohol use: Not Currently    Drug use: Never     Review of Systems   Constitutional:  Negative for chills and fever.   HENT:  Positive for dental problem and facial swelling. Negative for drooling.    Eyes:  Negative for visual disturbance.   Respiratory:  Negative for cough and shortness of breath.    Cardiovascular:  Negative for chest pain.   Gastrointestinal:  Negative for abdominal pain, nausea and vomiting.   Genitourinary:  Negative for dysuria.   Musculoskeletal:  Negative for arthralgias.   Skin:  Negative for  color change and rash.   Neurological:  Negative for dizziness and headaches.   Psychiatric/Behavioral:  Negative for behavioral problems.    All other systems reviewed and are negative.    Physical Exam     Initial Vitals [07/10/23 1550]   BP Pulse Resp Temp SpO2   (!) 142/92 71 20 98.9 °F (37.2 °C) 98 %      MAP       --         Physical Exam    Nursing note and vitals reviewed.  Constitutional: She appears well-developed and well-nourished.   HENT:   Head: Normocephalic and atraumatic.   Mouth/Throat: Uvula is midline, oropharynx is clear and moist and mucous membranes are normal. No oral lesions. No trismus in the jaw. Dental abscesses and dental caries present. No uvula swelling.       Eyes: EOM are normal. Pupils are equal, round, and reactive to light.   Neck: Neck supple.   Cardiovascular:  Normal rate, regular rhythm and normal heart sounds.           Pulmonary/Chest: Breath sounds normal.   Musculoskeletal:         General: Normal range of motion.      Cervical back: Neck supple.     Neurological: She is alert and oriented to person, place, and time. She has normal strength. GCS score is 15. GCS eye subscore is 4. GCS verbal subscore is 5. GCS motor subscore is 6.   Skin: Skin is warm and dry.   Psychiatric: She has a normal mood and affect.       ED Course   Procedures  Labs Reviewed - No data to display       Imaging Results    None          Medications   amoxicillin-clavulanate 875-125mg per tablet 1 tablet (1 tablet Oral Given 7/10/23 1917)   HYDROcodone-acetaminophen 7.5-325 mg per tablet 1 tablet (1 tablet Oral Given 7/10/23 1917)     Medical Decision Making:   Initial Assessment:   20-year-old female with a history of depression presents to ED with right lower dental pain that began yesterday.  Notes she woke up this morning and had swelling to the right lower jaw region.  Notes that her tooth has a hole in it has been meaning to go to a dentist for few months however notes that a lot has gone on  she is been able to get an appointment.  States she would a baby a month ago and almost in the homeless shelter.  Notes limited resource.  Denies fever, chills, difficulty swallowing, nausea, vomiting.  States while sitting in the waiting room, she felt as though the abscess in her mouth opened and she can taste it.    Differential Diagnosis:   Dental pain, dental abscess, dental caries                        Clinical Impression:   Final diagnoses:  [K04.7] Dental abscess (Primary)  [K02.9] Pain due to dental caries        ED Disposition Condition    Discharge Stable          ED Prescriptions       Medication Sig Dispense Start Date End Date Auth. Provider    amoxicillin-clavulanate 875-125mg (AUGMENTIN) 875-125 mg per tablet Take 1 tablet by mouth 2 (two) times daily. for 10 days 20 tablet 7/10/2023 7/20/2023 Kingsley Roman PA-C    HYDROcodone-acetaminophen (NORCO) 7.5-325 mg per tablet Take 1 tablet by mouth every 8 (eight) hours as needed for Pain. 15 tablet 7/10/2023 7/15/2023 Kingsley Roman PA-C    chlorhexidine (PERIDEX) 0.12 % solution Use as directed 15 mLs in the mouth or throat 2 (two) times daily. for 14 days 400 mL 7/10/2023 7/24/2023 Kingsley Roman PA-C          Follow-up Information       Follow up With Specialties Details Why Contact Info    Dental Clinic  Schedule an appointment as soon as possible for a visit  Refer to dental clinic resource sheet given to you     [facility] - Emergency Dept Emergency Medicine In 1 week If symptoms worsen [address] [facility] [id number] [phone]    Dentist  Schedule an appointment as soon as possible for a visit                Kingsley Roman PA-C  07/10/23 3125

## 2023-11-14 ENCOUNTER — HOSPITAL ENCOUNTER (EMERGENCY)
Facility: HOSPITAL | Age: 28
Discharge: HOME | End: 2023-11-14
Attending: EMERGENCY MEDICINE
Payer: MEDICAID

## 2023-11-14 VITALS
WEIGHT: 210 LBS | TEMPERATURE: 99 F | HEART RATE: 94 BPM | DIASTOLIC BLOOD PRESSURE: 62 MMHG | RESPIRATION RATE: 19 BRPM | BODY MASS INDEX: 38.64 KG/M2 | SYSTOLIC BLOOD PRESSURE: 124 MMHG | HEIGHT: 62 IN | OXYGEN SATURATION: 99 %

## 2023-11-14 DIAGNOSIS — J06.9 VIRAL URI WITH COUGH: Primary | ICD-10-CM

## 2023-11-14 LAB
FLUAV AG UPPER RESP QL IA.RAPID: NOT DETECTED
FLUBV AG UPPER RESP QL IA.RAPID: NOT DETECTED
SARS-COV-2 RNA RESP QL NAA+PROBE: NOT DETECTED

## 2023-11-14 PROCEDURE — 99284 EMERGENCY DEPT VISIT MOD MDM: CPT

## 2023-11-14 PROCEDURE — 0240U COVID/FLU A&B PCR: CPT | Performed by: EMERGENCY MEDICINE

## 2023-11-14 RX ORDER — BENZONATATE 100 MG/1
100 CAPSULE ORAL 3 TIMES DAILY PRN
Qty: 20 CAPSULE | Refills: 0 | Status: SHIPPED | OUTPATIENT
Start: 2023-11-14 | End: 2023-11-21

## 2023-11-14 RX ORDER — IBUPROFEN 600 MG/1
600 TABLET ORAL EVERY 6 HOURS PRN
Qty: 20 TABLET | Refills: 0 | OUTPATIENT
Start: 2023-11-14 | End: 2024-02-18

## 2023-11-14 RX ORDER — FLUTICASONE PROPIONATE 50 MCG
1 SPRAY, SUSPENSION (ML) NASAL 2 TIMES DAILY
Qty: 15.8 ML | Refills: 0 | Status: SHIPPED | OUTPATIENT
Start: 2023-11-14 | End: 2023-11-14 | Stop reason: SDUPTHER

## 2023-11-14 RX ORDER — IBUPROFEN 600 MG/1
600 TABLET ORAL EVERY 6 HOURS PRN
Qty: 20 TABLET | Refills: 0 | Status: SHIPPED | OUTPATIENT
Start: 2023-11-14 | End: 2023-11-14 | Stop reason: SDUPTHER

## 2023-11-14 RX ORDER — FLUTICASONE PROPIONATE 50 MCG
1 SPRAY, SUSPENSION (ML) NASAL 2 TIMES DAILY
Qty: 15.8 ML | Refills: 0 | Status: SHIPPED | OUTPATIENT
Start: 2023-11-14 | End: 2023-11-28

## 2023-11-14 RX ORDER — CETIRIZINE HYDROCHLORIDE 10 MG/1
10 TABLET ORAL DAILY
Qty: 7 TABLET | Refills: 0 | Status: SHIPPED | OUTPATIENT
Start: 2023-11-14 | End: 2023-11-14 | Stop reason: SDUPTHER

## 2023-11-14 RX ORDER — BENZONATATE 100 MG/1
100 CAPSULE ORAL 3 TIMES DAILY PRN
Qty: 20 CAPSULE | Refills: 0 | Status: SHIPPED | OUTPATIENT
Start: 2023-11-14 | End: 2023-11-14 | Stop reason: SDUPTHER

## 2023-11-14 RX ORDER — CETIRIZINE HYDROCHLORIDE 10 MG/1
10 TABLET ORAL DAILY
Qty: 7 TABLET | Refills: 0 | Status: SHIPPED | OUTPATIENT
Start: 2023-11-14 | End: 2023-11-21

## 2023-11-14 NOTE — DISCHARGE INSTRUCTIONS
Do not take the ibuprofen if you think you are pregnant.  If you think you might be pregnant take a pregnancy test prior to getting the medication.

## 2023-11-14 NOTE — ED PROVIDER NOTES
Encounter Date: 2023    SCRIBE #1 NOTE: I, Castillo Jj, am scribing for, and in the presence of,  Michael Luu MD. I have scribed the following portions of the note - Other sections scribed: HPI, ROS, PE.       History     Chief Complaint   Patient presents with    Cough     Pleuritic chest pain, cough, and body aches x2 days. Exposed to COVID by roommate, denies further symptoms.      28 year old female with no significant pmhx presents to ED c/o cough onset 2 days ago. Pt states that she has additional of symptoms of sore throat, myalgias, headache, pleuritic chest pain and congestion. Pt denies sick contacts, reports roommate had covid 1 month ago. Pt denies nausea.    The history is provided by the patient. No  was used.     Review of patient's allergies indicates:  No Known Allergies  Past Medical History:   Diagnosis Date    Depression      Past Surgical History:   Procedure Laterality Date     SECTION       SECTION N/A 2023    Procedure:  SECTION;  Surgeon: Jarvis Holguin MD;  Location: [facility];  Service: OB/GYN;  Laterality: N/A;     No family history on file.  Social History     Tobacco Use    Smoking status: Never    Smokeless tobacco: Never   Substance Use Topics    Alcohol use: Not Currently    Drug use: Never     Review of Systems   HENT:  Positive for congestion and sore throat.    Respiratory:  Positive for cough.    Cardiovascular:  Positive for chest pain (plueritic).   Gastrointestinal:  Negative for nausea.   Musculoskeletal:  Positive for myalgias.   Neurological:  Positive for headaches.       Physical Exam     Initial Vitals [23 0326]   BP Pulse Resp Temp SpO2   124/62 94 19 98.7 °F (37.1 °C) 99 %      MAP       --         Physical Exam    Nursing note and vitals reviewed.  Constitutional: She appears well-developed and well-nourished. No distress.   HENT:   Head: Normocephalic and atraumatic.   Nasal congestion,  minimal erythema to pharynx, no exudate    Eyes: Conjunctivae are normal.   Cardiovascular:  Normal rate and intact distal pulses.           Pulmonary/Chest: No respiratory distress. She has no rhonchi.   Abdominal: Abdomen is soft. Bowel sounds are normal. There is no abdominal tenderness. There is no rebound and no guarding.   Musculoskeletal:         General: No edema.     Neurological: She is alert. She has normal strength.   Skin: Skin is warm and dry.   Psychiatric: She has a normal mood and affect.         ED Course   Procedures  Labs Reviewed   COVID/FLU A&B PCR - Normal    Narrative:     The Xpert Xpress SARS-CoV-2/FLU/RSV plus is a rapid, multiplexed real-time PCR test intended for the simultaneous qualitative detection and differentiation of SARS-CoV-2, Influenza A, Influenza B, and respiratory syncytial virus (RSV) viral RNA in either nasopharyngeal swab or nasal swab specimens.                Imaging Results    None          Medications - No data to display  Medical Decision Making      Differential diagnosis includes but is not limited to  sinusitis pneumonia bronchitis, viral URI, pharyngitis    Hemodynamically stable no distress has nasal congestion on exam.  Lungs are clear minimal erythema posterior pharynx no purulence no swelling.  Symptoms likely related to a viral syndrome.  Recommend symptomatic treatment discussed this with her.  Negative for COVID and influenza here.  Lungs are clear satting well on room air no x-ray indicated at this time    Problems Addressed:  Viral URI with cough: acute illness or injury    Amount and/or Complexity of Data Reviewed  Labs: ordered.    Risk  OTC drugs.  Prescription drug management.            Scribe Attestation:   Scribe #1: I performed the above scribed service and the documentation accurately describes the services I performed. I attest to the accuracy of the note.    Attending Attestation:           Physician Attestation for Scribe:  Physician  Attestation Statement for Scribe #1: I, Michael Luu MD, reviewed documentation, as scribed by Castillo Jj in my presence, and it is both accurate and complete.                             Clinical Impression:   Final diagnoses:  [J06.9] Viral URI with cough (Primary)        ED Disposition Condition    Discharge Stable          ED Prescriptions       Medication Sig Dispense Start Date End Date Auth. Provider    benzonatate (TESSALON) 100 MG capsule Take 1 capsule (100 mg total) by mouth 3 (three) times daily as needed for Cough. 20 capsule 11/14/2023 11/21/2023 Michael Luu MD    cetirizine (ZYRTEC) 10 MG tablet Take 1 tablet (10 mg total) by mouth once daily. for 7 days 7 tablet 11/14/2023 11/21/2023 Michael Luu MD    fluticasone propionate (FLONASE) 50 mcg/actuation nasal spray 1 spray (50 mcg total) by Each Nostril route 2 (two) times a day. for 14 days 15.8 mL 11/14/2023 11/28/2023 Michael Luu MD    ibuprofen (ADVIL,MOTRIN) 600 MG tablet Take 1 tablet (600 mg total) by mouth every 6 (six) hours as needed for Pain. 20 tablet 11/14/2023 -- Michael Luu MD          Follow-up Information       Follow up With Specialties Details Why Contact Info    Primary care provider   You can call [phone] to get set up with a local primary care provider within the next few days.If your symptoms worsen or change please return to the emergency department for re-evaluation Call your primary care provider to schedule a follow-up appointment within a week             Michael Luu MD  11/14/23 8440

## 2024-01-24 ENCOUNTER — HOSPITAL ENCOUNTER (EMERGENCY)
Facility: HOSPITAL | Age: 29
Discharge: HOME | End: 2024-01-24
Attending: EMERGENCY MEDICINE
Payer: MEDICAID

## 2024-01-24 VITALS
SYSTOLIC BLOOD PRESSURE: 124 MMHG | WEIGHT: 210 LBS | RESPIRATION RATE: 20 BRPM | TEMPERATURE: 99 F | HEIGHT: 62 IN | DIASTOLIC BLOOD PRESSURE: 70 MMHG | OXYGEN SATURATION: 99 % | HEART RATE: 69 BPM | BODY MASS INDEX: 38.64 KG/M2

## 2024-01-24 DIAGNOSIS — T16.2XXA EAR FOREIGN BODY, LEFT, INITIAL ENCOUNTER: Primary | ICD-10-CM

## 2024-01-24 PROCEDURE — 99283 EMERGENCY DEPT VISIT LOW MDM: CPT

## 2024-01-24 PROCEDURE — 69200 CLEAR OUTER EAR CANAL: CPT | Mod: LT

## 2024-01-24 RX ORDER — CIPROFLOXACIN AND DEXAMETHASONE 3; 1 MG/ML; MG/ML
4 SUSPENSION/ DROPS AURICULAR (OTIC) 2 TIMES DAILY
Qty: 7.5 ML | Refills: 10 | Status: SHIPPED | OUTPATIENT
Start: 2024-01-24 | End: 2024-01-31

## 2024-01-24 NOTE — FIRST PROVIDER EVALUATION
"Medical screening examination initiated.  I have conducted a focused provider triage encounter, findings are as follows:    Brief history of present illness:  arrived to ED due to foreign body in L ear. States she thinks a tip of a q-tip is in her ear. Has attempted to remove without success.     Vitals:    01/24/24 1425   BP: 124/70   Pulse: 69   Resp: 20   Temp: 98.5 °F (36.9 °C)   SpO2: 99%   Weight: 95.3 kg (210 lb)   Height: 5' 2" (1.575 m)       Pertinent physical exam:  awake, alert, has non-labored breathing.    Brief workup plan:  provider evaluation    Preliminary workup initiated; this workup will be continued and followed by the physician or advanced practice provider that is assigned to the patient when roomed.  "

## 2024-01-24 NOTE — ED PROVIDER NOTES
Encounter Date: 2024       History     Chief Complaint   Patient presents with    Foreign Body in Ear     Reports foreign body obstructed in L ear x2 days. Decrease hearing due to obstruction.      Patient states that the cotton tip of a Q-tip has been stuck in her left ear x 2 days. Denies any fever, drainage, or any other symptoms. Denies any PMH.     The history is provided by the patient.     Review of patient's allergies indicates:  No Known Allergies  Past Medical History:   Diagnosis Date    Depression      Past Surgical History:   Procedure Laterality Date     SECTION       SECTION N/A 2023    Procedure:  SECTION;  Surgeon: Jarvis Holguin MD;  Location: [facility] L&D;  Service: OB/GYN;  Laterality: N/A;     No family history on file.  Social History     Tobacco Use    Smoking status: Never    Smokeless tobacco: Never   Substance Use Topics    Alcohol use: Not Currently    Drug use: Never     Review of Systems   Constitutional: Negative.    HENT:  Positive for ear pain. Negative for ear discharge.    Eyes: Negative.    Respiratory: Negative.     Cardiovascular: Negative.    Gastrointestinal: Negative.    Endocrine: Negative.    Genitourinary: Negative.    Musculoskeletal: Negative.    Skin: Negative.    Allergic/Immunologic: Negative.    Neurological: Negative.    Hematological: Negative.    Psychiatric/Behavioral: Negative.     All other systems reviewed and are negative.      Physical Exam     Initial Vitals [24 1425]   BP Pulse Resp Temp SpO2   124/70 69 20 98.5 °F (36.9 °C) 99 %      MAP       --         Physical Exam    Nursing note and vitals reviewed.  Constitutional: She appears well-developed and well-nourished. No distress.   HENT:   Head: Normocephalic and atraumatic.   Right Ear: Tympanic membrane, external ear and ear canal normal.   Left Ear: A foreign body (There is a cotton tip end of a Q-tip in ear canal.) is present. No mastoid tenderness.   Mouth/Throat:  Oropharynx is clear and moist.   Eyes: Conjunctivae and EOM are normal. Pupils are equal, round, and reactive to light.   Neck: Neck supple.   Normal range of motion.  Cardiovascular:  Normal rate, regular rhythm, normal heart sounds and intact distal pulses.           Pulmonary/Chest: Breath sounds normal. No respiratory distress. She has no wheezes.   Abdominal: Abdomen is soft. She exhibits no distension. There is no abdominal tenderness.   Musculoskeletal:         General: No tenderness or edema. Normal range of motion.      Cervical back: Normal range of motion and neck supple.     Neurological: She is alert and oriented to person, place, and time. She has normal strength. GCS score is 15. GCS eye subscore is 4. GCS verbal subscore is 5. GCS motor subscore is 6.   Skin: Skin is warm and dry. No rash noted.   Psychiatric: She has a normal mood and affect. Thought content normal.         ED Course   Foreign Body    Date/Time: 1/24/2024 6:00 PM    Performed by: Mary Benito FNP  Authorized by: Moshe Prado III, MD  Body area: ear  Anesthesia: see MAR for details (None)    Patient sedated: no  Patient restrained: no  Patient cooperative: yes  Removal mechanism: alligator forceps  Complexity: simple  1 objects recovered.  Objects recovered: One cotton end of a Q-tip was removed and appears to be intact.  Post-procedure assessment: foreign body removed  Patient tolerance: Patient tolerated the procedure well with no immediate complications      Labs Reviewed - No data to display       Imaging Results    None          Medications - No data to display  Medical Decision Making  Patient states that the cotton tip of a Q-tip has been stuck in her left ear x 2 days. Denies any fever, drainage, or any other symptoms. Denies any PMH.     The history is provided by the patient.   Patient is awake, alert, afebrile, and nontoxic appearing in the ED.    Amount and/or Complexity of Data Reviewed  Discussion of management  or test interpretation with external provider(s): Differential diagnosis (including but not limited to):   Judging by the patient's chief complaint and pertinent history, the patient has the following possible differential diagnoses, including but not limited to the following.  Some of these are deemed to be lower likelihood and some more likely based on my physical exam and history combined with possible lab work and/or imaging studies.   Please see the pertinent studies, and refer to the HPI.  Some of these diagnoses will take further evaluation to fully rule out, perhaps as an outpatient and the patient was encouraged to follow up when discharged for more comprehensive evaluation.  Foreign Body Ear, Otitis Media, Otitis Externa  Cotton end of Q-tip was removed for left external ear canal. Cotton Q-tip end appears to be intact. Left ear exam post foreign body removal shows a clear external ear canal and TM appears to be intact. Will discharge with antibiotic ear drops due to FB in left ear canal x 2 days. ED return precautions were given.                                         Clinical Impression:  Final diagnoses:  [T16.2XXA] Ear foreign body, left, initial encounter (Primary)          ED Disposition Condition    Discharge Stable          ED Prescriptions       Medication Sig Dispense Start Date End Date Auth. Provider    ciprofloxacin-dexAMETHasone 0.3-0.1% (CIPRODEX) 0.3-0.1 % DrpS Place 4 drops into the left ear 2 (two) times daily. for 7 days 7.5 mL 1/24/2024 1/31/2024 Mary Benito FNP          Follow-up Information       Follow up With Specialties Details Why Contact Info    Primary Care Provider  In 3 days      Radha Saeed MD Otolaryngology In 1 week  [address]  [address]  [facility] [address]  [phone]               Mary Benito FNP  01/24/24 3448

## 2024-01-24 NOTE — Clinical Note
"Tita"Ivette Canada was seen and treated in our emergency department on 1/24/2024.  She may return to work on 01/25/2024.       If you have any questions or concerns, please don't hesitate to call.      Mary Benito, WANDERP"

## 2024-02-18 ENCOUNTER — HOSPITAL ENCOUNTER (EMERGENCY)
Facility: HOSPITAL | Age: 29
Discharge: HOME | End: 2024-02-18
Attending: EMERGENCY MEDICINE
Payer: MEDICAID

## 2024-02-18 VITALS
HEART RATE: 67 BPM | SYSTOLIC BLOOD PRESSURE: 115 MMHG | HEIGHT: 62 IN | DIASTOLIC BLOOD PRESSURE: 68 MMHG | OXYGEN SATURATION: 96 % | TEMPERATURE: 98 F | WEIGHT: 215 LBS | RESPIRATION RATE: 18 BRPM | BODY MASS INDEX: 39.56 KG/M2

## 2024-02-18 DIAGNOSIS — N93.9 VAGINAL BLEEDING: Primary | ICD-10-CM

## 2024-02-18 LAB
ALBUMIN SERPL-MCNC: 3.8 G/DL (ref 3.5–5)
ALBUMIN/GLOB SERPL: 1 RATIO (ref 1.1–2)
ALP SERPL-CCNC: 47 UNIT/L (ref 40–150)
ALT SERPL-CCNC: 12 UNIT/L (ref 0–55)
APPEARANCE UR: ABNORMAL
AST SERPL-CCNC: 17 UNIT/L (ref 5–34)
B-HCG SERPL QL: NEGATIVE
BACTERIA #/AREA URNS AUTO: ABNORMAL /HPF
BASOPHILS # BLD AUTO: 0.05 X10(3)/MCL
BASOPHILS NFR BLD AUTO: 0.6 %
BILIRUB SERPL-MCNC: 0.2 MG/DL
BILIRUB UR QL STRIP.AUTO: NEGATIVE
BUN SERPL-MCNC: 13.4 MG/DL (ref 7–18.7)
CALCIUM SERPL-MCNC: 9.1 MG/DL (ref 8.4–10.2)
CHLORIDE SERPL-SCNC: 108 MMOL/L (ref 98–107)
CO2 SERPL-SCNC: 22 MMOL/L (ref 22–29)
COLOR UR AUTO: ABNORMAL
CREAT SERPL-MCNC: 0.76 MG/DL (ref 0.55–1.02)
EOSINOPHIL # BLD AUTO: 0.89 X10(3)/MCL (ref 0–0.9)
EOSINOPHIL NFR BLD AUTO: 11.5 %
ERYTHROCYTE [DISTWIDTH] IN BLOOD BY AUTOMATED COUNT: 15.6 % (ref 11.5–17)
GFR SERPLBLD CREATININE-BSD FMLA CKD-EPI: >60 MLS/MIN/1.73/M2
GLOBULIN SER-MCNC: 3.8 GM/DL (ref 2.4–3.5)
GLUCOSE SERPL-MCNC: 104 MG/DL (ref 74–100)
GLUCOSE UR QL STRIP.AUTO: NEGATIVE
HCT VFR BLD AUTO: 36.1 % (ref 37–47)
HGB BLD-MCNC: 11.1 G/DL (ref 12–16)
IMM GRANULOCYTES # BLD AUTO: 0.02 X10(3)/MCL (ref 0–0.04)
IMM GRANULOCYTES NFR BLD AUTO: 0.3 %
KETONES UR QL STRIP.AUTO: NEGATIVE
LEUKOCYTE ESTERASE UR QL STRIP.AUTO: NEGATIVE
LYMPHOCYTES # BLD AUTO: 2.03 X10(3)/MCL (ref 0.6–4.6)
LYMPHOCYTES NFR BLD AUTO: 26.1 %
MCH RBC QN AUTO: 24 PG (ref 27–31)
MCHC RBC AUTO-ENTMCNC: 30.7 G/DL (ref 33–36)
MCV RBC AUTO: 78.1 FL (ref 80–94)
MONOCYTES # BLD AUTO: 1.05 X10(3)/MCL (ref 0.1–1.3)
MONOCYTES NFR BLD AUTO: 13.5 %
NEUTROPHILS # BLD AUTO: 3.73 X10(3)/MCL (ref 2.1–9.2)
NEUTROPHILS NFR BLD AUTO: 48 %
NITRITE UR QL STRIP.AUTO: NEGATIVE
NRBC BLD AUTO-RTO: 0 %
PH UR STRIP.AUTO: 6 [PH]
PLATELET # BLD AUTO: 277 X10(3)/MCL (ref 130–400)
PMV BLD AUTO: 11 FL (ref 7.4–10.4)
POTASSIUM SERPL-SCNC: 4.3 MMOL/L (ref 3.5–5.1)
PROT SERPL-MCNC: 7.6 GM/DL (ref 6.4–8.3)
PROT UR QL STRIP.AUTO: ABNORMAL
RBC # BLD AUTO: 4.62 X10(6)/MCL (ref 4.2–5.4)
RBC #/AREA URNS AUTO: >100 /HPF
RBC UR QL AUTO: ABNORMAL
SODIUM SERPL-SCNC: 140 MMOL/L (ref 136–145)
SP GR UR STRIP.AUTO: >1.03 (ref 1–1.03)
SQUAMOUS #/AREA URNS LPF: ABNORMAL /HPF
UROBILINOGEN UR STRIP-ACNC: 0.2
WBC # SPEC AUTO: 7.77 X10(3)/MCL (ref 4.5–11.5)
WBC #/AREA URNS AUTO: ABNORMAL /HPF

## 2024-02-18 PROCEDURE — 81001 URINALYSIS AUTO W/SCOPE: CPT | Performed by: EMERGENCY MEDICINE

## 2024-02-18 PROCEDURE — 85025 COMPLETE CBC W/AUTO DIFF WBC: CPT | Performed by: EMERGENCY MEDICINE

## 2024-02-18 PROCEDURE — 81025 URINE PREGNANCY TEST: CPT | Performed by: EMERGENCY MEDICINE

## 2024-02-18 PROCEDURE — 80053 COMPREHEN METABOLIC PANEL: CPT | Performed by: EMERGENCY MEDICINE

## 2024-02-18 PROCEDURE — 87086 URINE CULTURE/COLONY COUNT: CPT | Performed by: EMERGENCY MEDICINE

## 2024-02-18 PROCEDURE — 99283 EMERGENCY DEPT VISIT LOW MDM: CPT

## 2024-02-18 RX ORDER — IBUPROFEN 600 MG/1
600 TABLET ORAL EVERY 6 HOURS PRN
Qty: 20 TABLET | Refills: 0 | Status: SHIPPED | OUTPATIENT
Start: 2024-02-18

## 2024-02-18 NOTE — DISCHARGE INSTRUCTIONS
Call your OBGYN of the [facility] clinic.  They may want to start you wants some medication to help slow down the bleeding.  You may need additional evaluation for something like a fibroid or other causes blood count is good today you do not need to be hospitalized nor do you need a blood transfusion at this time

## 2024-02-18 NOTE — ED PROVIDER NOTES
Encounter Date: 2024       History     Chief Complaint   Patient presents with    Vaginal Bleeding     Reports heavy vaginal bleeding X 2 weeks, increasing this am. Also reports abd cramping. Denies pregnancy. Reports saturating 2 pads this am.      29-year-old female  currently not pregnant.  She presents emergency department with irregular menstrual bleeding.  She states she has been having bleeding for 3 weeks using 5 pads per day.  States she feels generally weak no shortness of breath no chest pain no syncope.  No abdominal pain.  Had 1 previous stillborn no other complications with the pregnancy.  She is followed by an OBGYN at the [facility] has not yet seen them for this issue.        Review of patient's allergies indicates:  No Known Allergies  Past Medical History:   Diagnosis Date    Depression      Past Surgical History:   Procedure Laterality Date     SECTION       SECTION N/A 2023    Procedure:  SECTION;  Surgeon: Jarvis Holguin MD;  Location: [facility] L&D;  Service: OB/GYN;  Laterality: N/A;     No family history on file.  Social History     Tobacco Use    Smoking status: Never    Smokeless tobacco: Never   Substance Use Topics    Alcohol use: Not Currently    Drug use: Never     Review of Systems   Constitutional:  Negative for chills and fever.   Respiratory:  Negative for cough, chest tightness and shortness of breath.    Cardiovascular:  Negative for chest pain.   Gastrointestinal:  Negative for abdominal pain, nausea and vomiting.   Genitourinary:  Positive for vaginal bleeding.   Musculoskeletal:  Negative for myalgias and neck pain.   Neurological:  Negative for syncope.   All other systems reviewed and are negative.      Physical Exam     Initial Vitals [24 0631]   BP Pulse Resp Temp SpO2   123/76 70 19 97.8 °F (36.6 °C) 98 %      MAP       --         Physical Exam    Nursing note and vitals reviewed.  Constitutional: She appears well-developed and  well-nourished. No distress.   HENT:   Head: Normocephalic and atraumatic.   Eyes: Conjunctivae are normal.   Cardiovascular:  Normal rate and intact distal pulses.           Pulmonary/Chest: No respiratory distress. She has no rhonchi.   Abdominal: Abdomen is soft. Bowel sounds are normal. There is no abdominal tenderness. There is no rebound and no guarding.   Musculoskeletal:         General: No edema.     Neurological: She is alert. She has normal strength.   Skin: Skin is warm and dry.   Psychiatric: She has a normal mood and affect.         ED Course   Procedures  Labs Reviewed   COMPREHENSIVE METABOLIC PANEL - Abnormal; Notable for the following components:       Result Value    Chloride 108 (*)     Glucose Level 104 (*)     Globulin 3.8 (*)     Albumin/Globulin Ratio 1.0 (*)     All other components within normal limits   URINALYSIS, REFLEX TO URINE CULTURE - Abnormal; Notable for the following components:    Color, UA Red (*)     Appearance, UA Cloudy (*)     Specific Gravity, UA >1.030 (*)     Protein, UA 1+ (*)     Blood, UA 3+ (*)     WBC, UA 11-20 (*)     RBC, UA >100 (*)     All other components within normal limits   CBC WITH DIFFERENTIAL - Abnormal; Notable for the following components:    Hgb 11.1 (*)     Hct 36.1 (*)     MCV 78.1 (*)     MCH 24.0 (*)     MCHC 30.7 (*)     MPV 11.0 (*)     All other components within normal limits   PREGNANCY TEST, URINE RAPID - Normal   CULTURE, URINE   CBC W/ AUTO DIFFERENTIAL    Narrative:     The following orders were created for panel order CBC auto differential.  Procedure                               Abnormality         Status                     ---------                               -----------         ------                     CBC with Differential[[id number]]       Abnormal            Final result                 Please view results for these tests on the individual orders.          Imaging Results    None          Medications - No data to  display  Medical Decision Making  Irregular prolonged vaginal bleeding.  Pregnancy negative.  H&H is stable actually a good bit higher than her previous.  Discussed following up with her OBGYN with which she is established.  I will not start hormone therapy at this time OBGYN may choose to do that.  Discussed with the patient    Problems Addressed:  Vaginal bleeding: acute illness or injury    Amount and/or Complexity of Data Reviewed  Labs: ordered.    Risk  Prescription drug management.                                      Clinical Impression:  Final diagnoses:  [N93.9] Vaginal bleeding (Primary)          ED Disposition Condition    Discharge Stable          ED Prescriptions       Medication Sig Dispense Start Date End Date Auth. Provider    ibuprofen (ADVIL,MOTRIN) 600 MG tablet Take 1 tablet (600 mg total) by mouth every 6 (six) hours as needed for Pain. 20 tablet 2/18/2024 -- Michael Luu MD          Follow-up Information       Follow up With Specialties Details Why Contact Info    Primary care provider   You can call [phone] to get set up with a local primary care provider within the next few days.If your symptoms worsen or change please return to the emergency department for re-evaluation Call your primary care provider to schedule a follow-up appointment within a week    Gema El, NP Nurse Practitioner   [address] [place] [id number] [phone]               Michael Luu MD  02/18/24 2682

## 2024-02-20 LAB — BACTERIA UR CULT: ABNORMAL

## 2024-05-13 PROBLEM — Z3A.37 37 WEEKS GESTATION OF PREGNANCY: Status: RESOLVED | Noted: 2023-06-04 | Resolved: 2024-05-13

## 2024-08-02 ENCOUNTER — OFFICE VISIT (OUTPATIENT)
Dept: URGENT CARE | Facility: CLINIC | Age: 29
End: 2024-08-02
Payer: MEDICAID

## 2024-08-02 VITALS
BODY MASS INDEX: 43.06 KG/M2 | WEIGHT: 234 LBS | OXYGEN SATURATION: 98 % | TEMPERATURE: 99 F | RESPIRATION RATE: 16 BRPM | SYSTOLIC BLOOD PRESSURE: 129 MMHG | HEIGHT: 62 IN | DIASTOLIC BLOOD PRESSURE: 77 MMHG | HEART RATE: 75 BPM

## 2024-08-02 DIAGNOSIS — M67.431 GANGLION CYST OF DORSUM OF RIGHT WRIST: Primary | ICD-10-CM

## 2024-08-02 PROCEDURE — 99215 OFFICE O/P EST HI 40 MIN: CPT | Mod: PBBFAC

## 2024-08-02 RX ORDER — DULOXETIN HYDROCHLORIDE 60 MG/1
60 CAPSULE, DELAYED RELEASE ORAL EVERY MORNING
COMMUNITY
Start: 2024-03-08

## 2024-08-02 NOTE — LETTER
August 2, 2024      [facility]  [facility] [facility] [address]  Phone: [phone]       Patient: Tita Canada   YOB: 1995  Date of Visit: 08/02/2024    To Whom It May Concern:    Aida Canada  was at [facility] on 08/02/2024. The patient may return to work/school on 08/03/2024 with no restrictions. If you have any questions or concerns, or if I can be of further assistance, please do not hesitate to contact me.    Sincerely,    JOSE Myles

## 2024-08-02 NOTE — PROGRESS NOTES
"Subjective:       Patient ID: Tita Canada is a 29 y.o. female.    Vitals:  height is 5' 2" (1.575 m) and weight is 106.1 kg (234 lb). Her oral temperature is 98.5 °F (36.9 °C). Her blood pressure is 129/77 and her pulse is 75. Her respiration is 16 and oxygen saturation is 98%.     Chief Complaint: Wrist Pain (Patient has a cyst to right wrist that is painful at times. Patient states it has been there for 2 months)    29-year-old female reports to the clinic with complaints of cyst to right wrist that has been present for 2 months.  Patient states that cyst is sometimes painful, but sometimes she does not even remember that it is there and does not feel it.  Patient is interested in getting cyst removed and would like referral to Dermatology in order to do so.    All other systems are negative    Chart reviewed    Objective:   Physical Exam   Constitutional: She appears well-developed.  Non-toxic appearance. She does not appear ill. No distress.   Cardiovascular: Regular rhythm and normal heart sounds.   Pulmonary/Chest: Effort normal and breath sounds normal.   Abdominal: Bowel sounds are normal. She exhibits no distension. Soft. There is no abdominal tenderness.   Musculoskeletal: Normal range of motion.         General: Normal range of motion.   Skin: Skin is warm, dry, intact and not diaphoretic. Capillary refill takes less than 2 seconds. lesion              Comments: 2 cm x 2 cm ganglion cyst present on dorsal surface right wrist   Psychiatric: Her behavior is normal. Mood, judgment and thought content normal.   Nursing note and vitals reviewed.      Assessment:     1. Ganglion cyst of dorsum of right wrist            Plan:     Follow-up with dermatology as instructed  Discussed ER precautions  Discussed ganglion cysts and treatment/removal of cyst with patient    Ganglion cyst of dorsum of right wrist  -     Ambulatory referral/consult to Dermatology        Please note: This chart was completed via " voice to text dictation. It may contain typographical/word recognition errors. If there are any questions, please contact the provider for final clarification.

## 2024-09-16 ENCOUNTER — OFFICE VISIT (OUTPATIENT)
Dept: FAMILY MEDICINE | Facility: CLINIC | Age: 29
End: 2024-09-16
Payer: MEDICAID

## 2024-09-16 VITALS
OXYGEN SATURATION: 99 % | BODY MASS INDEX: 42.14 KG/M2 | TEMPERATURE: 99 F | DIASTOLIC BLOOD PRESSURE: 70 MMHG | SYSTOLIC BLOOD PRESSURE: 108 MMHG | HEART RATE: 78 BPM | WEIGHT: 229 LBS | HEIGHT: 62 IN | RESPIRATION RATE: 18 BRPM

## 2024-09-16 DIAGNOSIS — M67.431 GANGLION CYST OF WRIST, RIGHT: Primary | ICD-10-CM

## 2024-09-16 PROCEDURE — 99214 OFFICE O/P EST MOD 30 MIN: CPT | Mod: PBBFAC

## 2024-09-16 NOTE — PROGRESS NOTES
"[facility] Procedure Note  Tita Canada  [id number]  09/16/2024    Chief Complaint   Patient presents with    cyst T hand     History of Present Illness:  Tita Canada is a 29 y.o. female  presenting due to skin mass on the dorsum of (R) wrist for about 3.5 months. This is the first time that the patient has ever experienced a skin mass like this. She reports discomfort with palpation, (R) hand tightness, (R) hand gets cold, and a "weird sensation" radiating up her (R) arm. Also, the patient has noticed the skin mass has progressed in size and then slightly started to decrease in size recently. She has taken tylenol for the pain, but reports it only somewhat helps to relieve pain. Denies drainage, erythema, chills, and fever    Review of Systems as per HPI     Vitals:   Blood pressure 108/70, pulse 78, temperature 98.6 °F (37 °C), resp. rate 18, height 5' 1.81" (1.57 m), weight 103.9 kg (229 lb), last menstrual period 08/26/2024, SpO2 99%, not currently breastfeeding.     Physical Exam  Gen: NAD  Skin: 2cm x 2cm firm, round mobile mass of the (R) wrist consistent with a ganglion cyst               Assessment/Plan:  1. Ganglion cyst of wrist, right   -Wear neutral wrist splint for 2 weeks   -Ordered US to confirm diagnosis  -Will consider aspiration at next appt based on US findings and if not resolved with wrist splint      Return to clinic in 3 weeks for follow up, or sooner if needed.     Major Sims MD  [facility]     "

## 2024-09-16 NOTE — PATIENT INSTRUCTIONS
Purchase a neutral wrist splint &/or carpal tunnel wrist splint; wear for 2 weeks     Do not need splint that locks your thumb.. want splint with metal brace for wrist

## 2024-09-17 NOTE — PROGRESS NOTES
I have seen the patient, reviewed the resident's history and physical, assessment, plan, and progress note. I have personally interviewed and examined the patient at bedside and: agree with the findings.     Dawit Lopez MD  [facility]

## 2024-12-08 ENCOUNTER — HOSPITAL ENCOUNTER (EMERGENCY)
Facility: HOSPITAL | Age: 29
Discharge: HOME | End: 2024-12-08
Attending: STUDENT IN AN ORGANIZED HEALTH CARE EDUCATION/TRAINING PROGRAM
Payer: MEDICAID

## 2024-12-08 VITALS
DIASTOLIC BLOOD PRESSURE: 88 MMHG | SYSTOLIC BLOOD PRESSURE: 127 MMHG | RESPIRATION RATE: 18 BRPM | HEIGHT: 62 IN | OXYGEN SATURATION: 95 % | TEMPERATURE: 98 F | WEIGHT: 245 LBS | HEART RATE: 69 BPM | BODY MASS INDEX: 45.08 KG/M2

## 2024-12-08 DIAGNOSIS — H60.503 ACUTE OTITIS EXTERNA OF BOTH EARS, UNSPECIFIED TYPE: Primary | ICD-10-CM

## 2024-12-08 LAB
B-HCG UR QL: NEGATIVE
CTP QC/QA: YES

## 2024-12-08 PROCEDURE — 81025 URINE PREGNANCY TEST: CPT | Performed by: STUDENT IN AN ORGANIZED HEALTH CARE EDUCATION/TRAINING PROGRAM

## 2024-12-08 PROCEDURE — 99283 EMERGENCY DEPT VISIT LOW MDM: CPT

## 2024-12-08 RX ORDER — CIPROFLOXACIN AND DEXAMETHASONE 3; 1 MG/ML; MG/ML
4 SUSPENSION/ DROPS AURICULAR (OTIC) 2 TIMES DAILY
Qty: 7.5 ML | Refills: 0 | Status: SHIPPED | OUTPATIENT
Start: 2024-12-08 | End: 2024-12-15

## 2024-12-08 NOTE — ED PROVIDER NOTES
"Encounter Date: 2024       History     Chief Complaint   Patient presents with    Otalgia      Right-sided ear pain with drainage and "ringing" (x)4-5 days. Afebrile at this time. Rates pain 10. Vss. nadn     Patient presents to the emergency department due to bilateral ear pain.  Initially started on just the right side about 4 days ago and now the left side is involved as well.  She notes clearish drainage from the bilateral ears.  No fevers.    The history is provided by the patient.     Review of patient's allergies indicates:  No Known Allergies  Past Medical History:   Diagnosis Date    Depression      Past Surgical History:   Procedure Laterality Date     SECTION       SECTION N/A 2023    Procedure:  SECTION;  Surgeon: Jarvis Holguin MD;  [facility];  Service: OB/GYN;  Laterality: N/A;     No family history on file.  Social History     Tobacco Use    Smoking status: Every Day     Types: Cigarettes    Smokeless tobacco: Never   Substance Use Topics    Alcohol use: Not Currently    Drug use: Never     Review of Systems   Constitutional:  Negative for chills and fever.   HENT:  Positive for ear discharge and ear pain. Negative for congestion and sore throat.    Respiratory:  Negative for cough and shortness of breath.    Cardiovascular:  Negative for chest pain and palpitations.   Gastrointestinal:  Negative for abdominal pain and nausea.   Genitourinary:  Negative for dysuria and hematuria.   Musculoskeletal:  Negative for arthralgias and myalgias.   Neurological:  Negative for dizziness and weakness.       Physical Exam     Initial Vitals [24 1743]   BP Pulse Resp Temp SpO2   127/88 69 18 98.4 °F (36.9 °C) 95 %      MAP       --         Physical Exam    Nursing note and vitals reviewed.  Constitutional: She appears well-developed and well-nourished.   HENT:   Head: Normocephalic and atraumatic.   Bilateral TMs unremarkable but the bilateral ear canal showed " evidence of inflammation and   Eyes: EOM are normal. Pupils are equal, round, and reactive to light.   Neck: Neck supple.   Normal range of motion.  Cardiovascular:  Normal rate and regular rhythm.           Pulmonary/Chest: Breath sounds normal. No respiratory distress.   Abdominal: Abdomen is soft. There is no abdominal tenderness.   Musculoskeletal:         General: No edema. Normal range of motion.      Cervical back: Normal range of motion and neck supple.     Neurological: She is alert and oriented to person, place, and time.   Skin: Skin is warm and dry.         ED Course   Procedures  Labs Reviewed   POCT URINE PREGNANCY       Result Value    POC Preg Test, Ur Negative       Acceptable Yes            Imaging Results    None          Medications - No data to display  Medical Decision Making  Exam consistent with a bilateral otitis externa.  Ciprodex drops ordered, return precautions were given.    Amount and/or Complexity of Data Reviewed  Labs: ordered.    Risk  Prescription drug management.                                      Clinical Impression:  Final diagnoses:  [H60.503] Acute otitis externa of both ears, unspecified type (Primary)          ED Disposition Condition    Discharge Stable          ED Prescriptions       Medication Sig Dispense Start Date End Date Auth. Provider    ciprofloxacin-dexAMETHasone 0.3-0.1% (CIPRODEX) 0.3-0.1 % DrpS Place 4 drops into both ears 2 (two) times daily. for 7 days 7.5 mL 12/8/2024 12/15/2024 Walker Taylor MD          Follow-up Information       Follow up With Specialties Details Why Contact Info    [facility] Emergency Medicine Go to  If symptoms worsen [address], [place] [id number] [phone]             Walker Taylor MD  12/08/24 2018

## 2025-07-25 ENCOUNTER — HOSPITAL ENCOUNTER (EMERGENCY)
Facility: HOSPITAL | Age: 30
Discharge: HOME | End: 2025-07-25
Attending: INTERNAL MEDICINE
Payer: MEDICAID

## 2025-07-25 VITALS
TEMPERATURE: 98 F | BODY MASS INDEX: 40.48 KG/M2 | HEART RATE: 64 BPM | WEIGHT: 220 LBS | DIASTOLIC BLOOD PRESSURE: 80 MMHG | HEIGHT: 62 IN | OXYGEN SATURATION: 99 % | SYSTOLIC BLOOD PRESSURE: 138 MMHG | RESPIRATION RATE: 18 BRPM

## 2025-07-25 DIAGNOSIS — H60.93 OTITIS EXTERNA OF BOTH EARS, UNSPECIFIED CHRONICITY, UNSPECIFIED TYPE: Primary | ICD-10-CM

## 2025-07-25 DIAGNOSIS — H66.001 NON-RECURRENT ACUTE SUPPURATIVE OTITIS MEDIA OF RIGHT EAR WITHOUT SPONTANEOUS RUPTURE OF TYMPANIC MEMBRANE: ICD-10-CM

## 2025-07-25 LAB
B-HCG UR QL: NEGATIVE
CTP QC/QA: YES
POCT GLUCOSE: 96 MG/DL (ref 70–110)

## 2025-07-25 PROCEDURE — 81025 URINE PREGNANCY TEST: CPT

## 2025-07-25 PROCEDURE — 82962 GLUCOSE BLOOD TEST: CPT

## 2025-07-25 PROCEDURE — 99284 EMERGENCY DEPT VISIT MOD MDM: CPT

## 2025-07-25 RX ORDER — AMOXICILLIN AND CLAVULANATE POTASSIUM 875; 125 MG/1; MG/1
1 TABLET, FILM COATED ORAL 2 TIMES DAILY
Qty: 14 TABLET | Refills: 0 | Status: SHIPPED | OUTPATIENT
Start: 2025-07-25

## 2025-07-25 RX ORDER — CIPROFLOXACIN AND DEXAMETHASONE 3; 1 MG/ML; MG/ML
4 SUSPENSION/ DROPS AURICULAR (OTIC) 2 TIMES DAILY
Qty: 7.5 ML | Refills: 0 | Status: SHIPPED | OUTPATIENT
Start: 2025-07-25

## 2025-07-25 NOTE — ED PROVIDER NOTES
Encounter Date: 2025       History     Chief Complaint   Patient presents with    Otalgia     Pt c/o bilateral ear pain worse on the right with muffled hearing as well. Symptoms x2 days.     A 30 y.o. female patient with a history of depression presents to the ED with ear pain. The onset is about two days ago. Reports associated swelling and reduced hearing worse in the right ear than left. States that she was mildly congested last week but it resolved on it's own. Denies fever, chills, nausea, vomiting, mastoid tenderness.         The history is provided by the patient.     Review of patient's allergies indicates:  No Known Allergies  Past Medical History:   Diagnosis Date    Depression      Past Surgical History:   Procedure Laterality Date     SECTION       SECTION N/A 2023    Procedure:  SECTION;  Surgeon: Jarvis Holguin MD;  [facility]  Service: OB/GYN;  Laterality: N/A;     No family history on file.  Social History[1]  Review of Systems   Constitutional:  Negative for chills and fever.   HENT:  Positive for ear pain. Negative for congestion, ear discharge and sore throat.    Eyes:  Negative for visual disturbance.   Respiratory:  Negative for shortness of breath.    Cardiovascular:  Negative for chest pain.   Gastrointestinal:  Negative for nausea and vomiting.   Genitourinary:  Negative for difficulty urinating and dysuria.   Musculoskeletal:  Negative for arthralgias.   Skin:  Negative for color change and rash.   Neurological:  Negative for speech difficulty, weakness and headaches.   Hematological:  Does not bruise/bleed easily.   Psychiatric/Behavioral:  Negative for confusion.    All other systems reviewed and are negative.      Physical Exam     Initial Vitals [25 1512]   BP Pulse Resp Temp SpO2   (!) 143/79 60 20 98.2 °F (36.8 °C) 100 %      MAP       --         Physical Exam    Nursing note and vitals reviewed.  Constitutional: She appears  well-developed and well-nourished.   HENT:   Head: Normocephalic and atraumatic.   Right Ear: External ear normal. There is swelling and tenderness. No mastoid tenderness. Tympanic membrane is bulging.   Left Ear: Tympanic membrane and external ear normal. No mastoid tenderness.   Nose: Nose normal. No rhinorrhea or sinus tenderness. Mouth/Throat: Uvula is midline, oropharynx is clear and moist and mucous membranes are normal. No oropharyngeal exudate or posterior oropharyngeal erythema.   Purulent ear canals b/l R>L. Pain with auricular movement. Purulent effusion behind right TM. Unable to visualize ossicles. No light reflex.    Eyes: Conjunctivae and EOM are normal.   Neck: Neck supple.   Cardiovascular:  Normal rate, regular rhythm and normal heart sounds.     Exam reveals no gallop and no friction rub.       No murmur heard.  Pulmonary/Chest: Breath sounds normal. No respiratory distress. She has no wheezes. She has no rhonchi. She has no rales.   Abdominal: She exhibits no distension.   Musculoskeletal:      Cervical back: Neck supple.     Neurological: She is alert and oriented to person, place, and time.   Skin: Skin is warm and dry.         ED Course   Procedures  Labs Reviewed   POCT URINE PREGNANCY       Result Value    POC Preg Test, Ur Negative       Acceptable Yes     POCT GLUCOSE, HAND-HELD DEVICE   POCT GLUCOSE    POCT Glucose 96            Imaging Results    None          Medications - No data to display  Medical Decision Making  A 30 y.o. female patient with a history of depression presents to the ED with ear pain. The onset is about two days ago. Reports associated swelling and reduced hearing worse in the right ear than left. States that she was mildly congested last week but it resolved on it's own. Denies fever, chills, nausea, vomiting, mastoid tenderness.       POCT glucose 96. UPT negative.     Clinical impression:  Otitis externa of both ears, unspecified chronicity,  unspecified type (Primary)  Non-recurrent acute suppurative otitis media of right ear without spontaneous rupture of tympanic membrane    Patient is non-toxic appearing and tolerating nutritional intake. Patient's vital signs and clinical condition are stable for DC with ED Prescriptions     Medication Sig Dispense Start Date End Date Auth. Provider    amoxicillin-clavulanate 875-125mg (AUGMENTIN) 875-125 mg per tablet Take   1 tablet by mouth 2 (two) times daily. 14 tablet 7/25/2025 -- Ruthie Thompson PA    ciprofloxacin-dexAMETHasone 0.3-0.1% (CIPRODEX) 0.3-0.1 % DrpS Place 4   drops into both ears 2 (two) times daily. 7.5 mL 7/25/2025 -- Ruthie Thompson PA         Follow-up: PCP or Internal medicine clinic within 3 days  Referrals made:[facility]    Strict follow-up precautions given. Patient verbalizes understanding of treatment plan and ED return precautions.       Amount and/or Complexity of Data Reviewed  Labs: ordered. Decision-making details documented in ED Course.    Risk  Prescription drug management.  Risk Details: Given strict ED return precautions. I have spoken with the patient and/or caregivers. I have explained the patient's condition, diagnoses and treatment plan based on the information available to me at this time. I have answered the patient's and/or caregiver's questions and addressed any concerns. The patient and/or caregivers have as good an understanding of the patient's diagnosis, condition and treatment plan as can be expected at this point. The patient's condition is stable and appropriate for discharge from the emergency department.      The patient will pursue further outpatient evaluation with the primary care physician or other designated or consulting physician as outlined in the discharge instructions. The patient and/or caregivers are agreeable to this plan of care and follow-up instructions have been explained in detail. The patient and/or caregivers have received these  instructions in written format and have expressed an understanding of the discharge instructions. The patient and/or caregivers are aware that any significant change in condition or worsening of symptoms should prompt an immediate return to this or the closest emergency department or a call to 911.               Additional MDM:   Differential Diagnosis:   Other: The following diagnoses were also considered and will be evaluated: AOM, otitis externa and ETD.            ED Course as of 07/25/25 1740   Fri Jul 25, 2025   1632 hCG Qualitative, Urine: Negative [AG]   1633 POCT Glucose: 96 [AG]      ED Course User Index  [AG] Ruthie Thompson PA                               Clinical Impression:  Final diagnoses:  [H60.93] Otitis externa of both ears, unspecified chronicity, unspecified type (Primary)  [H66.001] Non-recurrent acute suppurative otitis media of right ear without spontaneous rupture of tympanic membrane          ED Disposition Condition    Discharge Stable          ED Prescriptions       Medication Sig Dispense Start Date End Date Auth. Provider    amoxicillin-clavulanate 875-125mg (AUGMENTIN) 875-125 mg per tablet Take 1 tablet by mouth 2 (two) times daily. 14 tablet 7/25/2025 -- Ruthie Thompson PA    ciprofloxacin-dexAMETHasone 0.3-0.1% (CIPRODEX) 0.3-0.1 % DrpS Place 4 drops into both ears 2 (two) times daily. 7.5 mL 7/25/2025 -- Ruthie Thompson PA          Follow-up Information       Follow up With Specialties Details Why Contact Info    Your primary care provider  Go in 3 days      [facility] Emergency Medicine Go to  If symptoms worsen, As needed [address] [facility] [id number] [phone]                   [1]   Social History  Tobacco Use    Smoking status: Every Day     Types: Cigarettes    Smokeless tobacco: Never   Substance Use Topics    Alcohol use: Not Currently    Drug use: Never        Ruthie Thompson PA  07/25/25 1740

## (undated) DEVICE — PAD SANITARY OB STERILE

## (undated) DEVICE — Device

## (undated) DEVICE — POWDER ARISTA AH 3G

## (undated) DEVICE — STAPLER SKIN PROXIMATE REG

## (undated) DEVICE — CAP BABY BEANIE

## (undated) DEVICE — BULB SYRINGE EAR IRRIGATION

## (undated) DEVICE — SOL WATER STRL IRR 1000ML

## (undated) DEVICE — ELECTRODE REM PLYHSV RETURN 9

## (undated) DEVICE — SUT VICRYL 2-0 36 CT-1

## (undated) DEVICE — PAD UNDERPAD 30X30

## (undated) DEVICE — SEE MEDLINE ITEM 156931

## (undated) DEVICE — SEE MEDLINE ITEM 157117

## (undated) DEVICE — SOL NACL IRR 1000ML BTL

## (undated) DEVICE — SKIN STAPLER PMR35

## (undated) DEVICE — SUT CTD VICRYL 1 VIL BR CTX

## (undated) DEVICE — BINDER ABDOM 4PANEL 12IN LG/XL